# Patient Record
Sex: FEMALE | Race: WHITE | URBAN - NONMETROPOLITAN AREA
[De-identification: names, ages, dates, MRNs, and addresses within clinical notes are randomized per-mention and may not be internally consistent; named-entity substitution may affect disease eponyms.]

---

## 2017-01-09 RX ORDER — CITALOPRAM 10 MG/1
TABLET ORAL
Qty: 45 TABLET | Refills: 0 | Status: SHIPPED | OUTPATIENT
Start: 2017-01-09 | End: 2017-01-30

## 2017-01-27 ENCOUNTER — OFFICE VISIT (OUTPATIENT)
Dept: FAMILY MEDICINE CLINIC | Facility: CLINIC | Age: 32
End: 2017-01-27

## 2017-01-27 VITALS
DIASTOLIC BLOOD PRESSURE: 80 MMHG | BODY MASS INDEX: 36 KG/M2 | SYSTOLIC BLOOD PRESSURE: 130 MMHG | TEMPERATURE: 97 F | WEIGHT: 224.38 LBS

## 2017-01-27 DIAGNOSIS — R53.83 FATIGUE, UNSPECIFIED TYPE: ICD-10-CM

## 2017-01-27 DIAGNOSIS — R06.83 SNORING: ICD-10-CM

## 2017-01-27 DIAGNOSIS — E03.9 ACQUIRED HYPOTHYROIDISM: Primary | ICD-10-CM

## 2017-01-27 PROCEDURE — 99213 OFFICE O/P EST LOW 20 MIN: CPT | Performed by: FAMILY MEDICINE

## 2017-01-27 NOTE — PROGRESS NOTES
Cheri Medina is a 33year old female. Patient presents with:  Fatigue: pt battling fatigue for years; dizziness daily. . room 3      HPI:   Fatigued as noted    Sleeps heavy needs to be pushed out of bed  She is tired despite 8-9 hrs  She feels she has 120/78  02/03/16 : 120/88  01/27/16 : 120/80      Wt Readings from Last 6 Encounters:  01/27/17 : 224 lb 6 oz  11/29/16 : 213 lb  03/28/16 : 213 lb  02/25/16 : 208 lb  02/03/16 : 205 lb 4 oz  01/27/16 : 209 lb      REVIEW OF SYSTEMS:   GENERAL HEALTH: feel Expiration Date: 1/27/2018  TSH and Free T4  Standing Status: Future  Standing Expiration Date: 1/27/2018            Meds & Refills for this Visit:  No prescriptions requested or ordered in this encounter              Ronan Brooks M.D., FAAFP      The

## 2017-01-29 NOTE — PATIENT INSTRUCTIONS
What Are Snoring and Obstructive Sleep Apnea? If Bertha Mera ever had a stuffed-up nose, you know the feeling of trying to breathe through a very narrow passageway. This is what happens in your throat when you snore.  While you sleep, structures in your throa Problems in the structure of the nose may obstruct breathing. A crooked (deviated) septum or swollen turbinates can make snoring worse or lead to apnea.  Also, a receding jaw may make the tongue sit too far back, so it’s more likely to block the airway when · At a sleep clinic. Most sleep studies are done at a sleep clinic or a sleep lab. In many cases, you will need to stay overnight. You will sleep in a private room, much like a hotel or hospital room.  A family member or a friend can come along, but cannot

## 2017-01-30 ENCOUNTER — TELEPHONE (OUTPATIENT)
Dept: FAMILY MEDICINE CLINIC | Facility: CLINIC | Age: 32
End: 2017-01-30

## 2017-01-30 RX ORDER — CITALOPRAM 10 MG/1
TABLET ORAL
Qty: 135 TABLET | Refills: 0 | Status: SHIPPED | OUTPATIENT
Start: 2017-01-30 | End: 2017-05-25

## 2017-01-30 NOTE — TELEPHONE ENCOUNTER
RQI performed and per representative no RQI is needed for sleep studies and the reference number is the patient's ID, [id number]. Test result faxed to number provided.

## 2017-01-31 ENCOUNTER — TELEPHONE (OUTPATIENT)
Dept: FAMILY MEDICINE CLINIC | Facility: CLINIC | Age: 32
End: 2017-01-31

## 2017-01-31 DIAGNOSIS — G47.33 OBSTRUCTIVE SLEEP APNEA: ICD-10-CM

## 2017-01-31 DIAGNOSIS — G47.19 EXCESSIVE DAYTIME SLEEPINESS: Primary | ICD-10-CM

## 2017-01-31 NOTE — TELEPHONE ENCOUNTER
Reviewed results with patient and she voiced understanding. Per Dr. Powell: Thyroid, Iron, CBC results all normal and no anemia was seen. Results sent to scanning.

## 2017-02-06 ENCOUNTER — TELEPHONE (OUTPATIENT)
Dept: FAMILY MEDICINE CLINIC | Facility: CLINIC | Age: 32
End: 2017-02-06

## 2017-02-06 NOTE — TELEPHONE ENCOUNTER
Patient notified and she is going to try and change the time she takes her Citalopram to see if this makes a difference.

## 2017-04-06 ENCOUNTER — TELEPHONE (OUTPATIENT)
Dept: FAMILY MEDICINE CLINIC | Facility: CLINIC | Age: 32
End: 2017-04-06

## 2017-05-25 RX ORDER — CITALOPRAM 10 MG/1
TABLET ORAL
Qty: 135 TABLET | Refills: 0 | Status: SHIPPED | OUTPATIENT
Start: 2017-05-25 | End: 2017-07-31

## 2017-07-31 RX ORDER — CITALOPRAM 10 MG/1
TABLET ORAL
Qty: 135 TABLET | Refills: 0 | Status: SHIPPED | OUTPATIENT
Start: 2017-07-31 | End: 2017-10-21

## 2017-07-31 NOTE — TELEPHONE ENCOUNTER
LAST office visit: 1/27/17  Last refill: 5/25/17 # 135 with 0 refills       Reminder letter sent for office visit.

## 2017-08-22 ENCOUNTER — TELEPHONE (OUTPATIENT)
Dept: FAMILY MEDICINE CLINIC | Facility: CLINIC | Age: 32
End: 2017-08-22

## 2017-08-22 NOTE — TELEPHONE ENCOUNTER
Can she get a order for cosyntropin stimulation test at ? Will Irina Zepeda write? Irina Zepeda is the only doctor on staff there. She is Sherry's pt. Call pt.

## 2017-08-22 NOTE — TELEPHONE ENCOUNTER
Patient's endocrinologist Dr. Terrell Regalado gave patient an order for a Cosyntropin Stimulation test and the patient is asking if Dr. Alejandro Smiley would order this test to be performed at [facility] instead of [facility] ? Please advise.          This i

## 2017-08-22 NOTE — TELEPHONE ENCOUNTER
[facility] is needing [facility]? Is she having symptoms of any kind? Also advise to please contact Dr. John Sheridan for orders first, even if Dr. Adan Ignacio is needed to order. Thanks.

## 2017-08-25 NOTE — TELEPHONE ENCOUNTER
Left detailed message for pt in regards to Dr. Lincoln Anthony message.  Office number provided for questions

## 2017-10-21 ENCOUNTER — TELEPHONE (OUTPATIENT)
Dept: FAMILY MEDICINE CLINIC | Facility: CLINIC | Age: 32
End: 2017-10-21

## 2017-10-21 RX ORDER — CITALOPRAM 10 MG/1
TABLET ORAL
Qty: 21 TABLET | Refills: 0 | Status: SHIPPED | OUTPATIENT
Start: 2017-10-21 | End: 2017-10-25 | Stop reason: ALTCHOICE

## 2017-10-21 NOTE — TELEPHONE ENCOUNTER
Per epic: pt is to be taking 1.5 tablets daily but pt states she is taking 2  Per last refill, pt is to be see  No future appointments. Left message for pt to call back  Sent #21 to [facility] until pt is seen.

## 2017-10-21 NOTE — TELEPHONE ENCOUNTER
CITALOPRAM HYDROBROMIDE 10 MG    Pt is taking 2 pills per day and needs a refill. Wants to know if 10 pills can be sent to [facility] [employer] and send a full refill to [facility]?      Pt is going to be out before the mail order comes and when she does

## 2017-10-23 NOTE — TELEPHONE ENCOUNTER
Patient states that Dr. Irma Turpin advised her that it was ok to increase to 2 tablets daily. Advised pt that it is best to schedule a medication f/u with Dr. Irma Turpin.  Patient agreed   Future Appointments  Date Time Provider Amy Puri   10/25/2017 1:4

## 2017-10-25 ENCOUNTER — OFFICE VISIT (OUTPATIENT)
Dept: FAMILY MEDICINE CLINIC | Facility: CLINIC | Age: 32
End: 2017-10-25

## 2017-10-25 VITALS
SYSTOLIC BLOOD PRESSURE: 120 MMHG | HEART RATE: 74 BPM | HEIGHT: 66 IN | TEMPERATURE: 98 F | DIASTOLIC BLOOD PRESSURE: 80 MMHG | BODY MASS INDEX: 35.2 KG/M2 | OXYGEN SATURATION: 99 % | WEIGHT: 219 LBS

## 2017-10-25 DIAGNOSIS — Z79.899 ENCOUNTER FOR LONG-TERM (CURRENT) USE OF MEDICATIONS: Primary | ICD-10-CM

## 2017-10-25 DIAGNOSIS — E03.9 ACQUIRED HYPOTHYROIDISM: ICD-10-CM

## 2017-10-25 DIAGNOSIS — F41.9 ANXIETY: ICD-10-CM

## 2017-10-25 DIAGNOSIS — E55.9 VITAMIN D DEFICIENCY: ICD-10-CM

## 2017-10-25 PROCEDURE — 99213 OFFICE O/P EST LOW 20 MIN: CPT | Performed by: FAMILY MEDICINE

## 2017-10-25 RX ORDER — CLINDAMYCIN AND BENZOYL PEROXIDE 10; 50 MG/G; MG/G
GEL TOPICAL
Refills: 11 | COMMUNITY
Start: 2017-08-30

## 2017-10-25 RX ORDER — CITALOPRAM 20 MG/1
20 TABLET ORAL DAILY
Qty: 90 TABLET | Refills: 1 | Status: SHIPPED | OUTPATIENT
Start: 2017-10-25 | End: 2018-04-06

## 2017-10-25 RX ORDER — ERGOCALCIFEROL 1.25 MG/1
50000 CAPSULE ORAL
COMMUNITY
Start: 2017-10-02 | End: 2019-02-20 | Stop reason: ALTCHOICE

## 2017-10-25 NOTE — PROGRESS NOTES
Illene Situ is a 27year old female. No chief complaint on file.       HPI:   Here for follow up  Feels the citalopram is the right dose    Tolerates it well    Denies other issues         Feels better  Sees endo for thyroid nodule  Replacement  And al auscultation  CARDIO: RRR without murmur    MOOD  Well controlled  Happy not tearful  Insightful    ASSESSMENT AND PLAN:     Encounter for long-term (current) use of medications  (primary encounter diagnosis)  Anxiety  Vitamin d deficiency  Acquired hypoth

## 2017-10-25 NOTE — PATIENT INSTRUCTIONS
Vitamin D  Does this test have other names? 25-hydroxyvitamin D (25-high-DROX-ee-VIE-tuh-min D), 25(OH)D  What is this test?  Vitamin D is mainly found in fortified dairy foods, juice, breakfast cereal, and certain fish.  This vitamin plays many roles in A result for a lab test may be affected by many things, including the method the laboratory uses to do the test. If your test results are different from the normal value, you may not have a problem.  To learn what the results mean for you, talk with your he © 1518-7718 [employer] [address] [facility] [place]. All rights reserved. This information is not intended as a substitute for professional medical care. Always follow your healthcare professional's instructions.

## 2017-11-08 ENCOUNTER — TELEPHONE (OUTPATIENT)
Dept: FAMILY MEDICINE CLINIC | Facility: CLINIC | Age: 32
End: 2017-11-08

## 2017-11-08 NOTE — TELEPHONE ENCOUNTER
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 Pt is also wanting to know if Dr. Ksenia Yang will use the information from the last OV (10/25) mfor the school assessment form.  Pt states that it is only requires height, weight, BP and

## 2017-11-08 NOTE — TELEPHONE ENCOUNTER
WOULD DR BE WILLING TO FILL OUT HEALTH ASSESSMENT FORM FROM HER LAST OFFICE VISIT, JUST NEED HEIGHT, WEIGHT, ETC.... CALL PT

## 2017-11-09 ENCOUNTER — TELEPHONE (OUTPATIENT)
Dept: FAMILY MEDICINE CLINIC | Facility: CLINIC | Age: 32
End: 2017-11-09

## 2017-11-09 DIAGNOSIS — Z01.84 IMMUNITY STATUS TESTING: ICD-10-CM

## 2017-11-09 DIAGNOSIS — Z11.1 SCREENING FOR TUBERCULOSIS: Primary | ICD-10-CM

## 2017-11-09 NOTE — TELEPHONE ENCOUNTER
Pt states that she was advised that she needs to have college titers (MMR, Varicella, Hep B) done.  Pt would like to get them at [address] since she works there  Labs placed and faxed to [facility]  Date Time Provider Amy Puri   11/13/2017

## 2017-11-13 ENCOUNTER — NURSE ONLY (OUTPATIENT)
Dept: FAMILY MEDICINE CLINIC | Facility: CLINIC | Age: 32
End: 2017-11-13

## 2017-11-13 PROCEDURE — 86580 TB INTRADERMAL TEST: CPT | Performed by: FAMILY MEDICINE

## 2017-11-15 ENCOUNTER — NURSE ONLY (OUTPATIENT)
Dept: FAMILY MEDICINE CLINIC | Facility: CLINIC | Age: 32
End: 2017-11-15

## 2017-11-15 DIAGNOSIS — Z11.1 TUBERCULOSIS SCREENING: Primary | ICD-10-CM

## 2017-11-17 ENCOUNTER — TELEPHONE (OUTPATIENT)
Dept: FAMILY MEDICINE CLINIC | Facility: CLINIC | Age: 32
End: 2017-11-17

## 2017-11-17 DIAGNOSIS — H54.60: ICD-10-CM

## 2017-11-17 DIAGNOSIS — H47.10 OPTIC NERVE EDEMA: Primary | ICD-10-CM

## 2017-11-17 DIAGNOSIS — R93.89 ABNORMAL ULTRASOUND: ICD-10-CM

## 2017-11-17 NOTE — TELEPHONE ENCOUNTER
Patient in office. States when she was seen she did not pass vision portion part of physical.  Was referred to opthalmologist and found to have fluid on optic nerve. Was further referred to specialist whom she is seeing this afternoon.   Wants to know how

## 2017-11-17 NOTE — TELEPHONE ENCOUNTER
Pt was in with daughter for appt with Dr. Mamadou Waldron, would like to speak to nurse regarding herself, said it was personal. Advised nurse will be at lunch when they come out from appt and that nurse would call her back this afternoon.

## 2017-11-17 NOTE — TELEPHONE ENCOUNTER
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 On US, Dr. Mary Garcia sees something that appears to be pushing the eyes forward. He is recommending MRI brain and orbit with and without contrast.  Wants this done in the next couple days. Patient works at [employer].

## 2017-11-20 ENCOUNTER — NURSE ONLY (OUTPATIENT)
Dept: FAMILY MEDICINE CLINIC | Facility: CLINIC | Age: 32
End: 2017-11-20

## 2017-11-20 ENCOUNTER — TELEPHONE (OUTPATIENT)
Dept: FAMILY MEDICINE CLINIC | Facility: CLINIC | Age: 32
End: 2017-11-20

## 2017-11-20 PROCEDURE — 86580 TB INTRADERMAL TEST: CPT

## 2017-11-22 ENCOUNTER — NURSE ONLY (OUTPATIENT)
Dept: FAMILY MEDICINE CLINIC | Facility: CLINIC | Age: 32
End: 2017-11-22

## 2017-12-14 ENCOUNTER — MED REC SCAN ONLY (OUTPATIENT)
Dept: FAMILY MEDICINE CLINIC | Facility: CLINIC | Age: 32
End: 2017-12-14

## 2018-01-02 ENCOUNTER — MED REC SCAN ONLY (OUTPATIENT)
Dept: FAMILY MEDICINE CLINIC | Facility: CLINIC | Age: 33
End: 2018-01-02

## 2018-04-03 ENCOUNTER — TELEPHONE (OUTPATIENT)
Dept: FAMILY MEDICINE CLINIC | Facility: CLINIC | Age: 33
End: 2018-04-03

## 2018-04-03 NOTE — TELEPHONE ENCOUNTER
Called [facility] for PA on Adderall 10mg, spoke with Jassi Serrano.    PA approved  4/3/2018 to 4/3/2021  PA#  [id number]

## 2018-04-03 NOTE — PATIENT INSTRUCTIONS
Amphetamine; Dextroamphetamine tablets  Brand Name: Adderall  What is this medicine? AMPHETAMINE; DEXTROAMPHETAMINE(am FET a meen; dex troe am FET a meen) is used to treat attention-deficit hyperactivity disorder (ADHD).  It may also be used for narcolep · loss of appetite  · nausea, vomiting  · trouble sleeping  · weight loss  What may interact with this medicine?   Do not take this medicine with any of the following medications:  · MAOIS like Carbex, Eldepryl, Marplan, Nardil, and Parnate  · other stimula Store at room temperature between 15 and 30 degrees C (59 and 86 degrees F). Keep container tightly closed. Throw away any unused medicine after the expiration date. Dispose of properly.  This medicine may cause accidental overdose and death if it is taken Tell your doctor or health care professional if this medicine loses its effects, or if you feel you need to take more than the prescribed amount.  Do not change the dosage without talking to your doctor or health care professional.  Decreased appetite is a

## 2018-04-03 NOTE — PROGRESS NOTES
Silvia Mcgregor is a 27year old female. Patient presents with: Anxiety: pt feeling stressed from factors in her current life. . room 5      HPI:   Pt here for anxiety  Now in nurses school  And clinical  Has difficulty with focus in busy environments and denies any unusual skin lesions or rashes  PSY MOOD  see HPI    NEURO: denies headaches    EXAM:   /70   Temp 98.5 °F (36.9 °C) (Temporal)   Wt 234 lb 2 oz   BMI 37.79 kg/m²  Body mass index is 37.79 kg/m².       GENERAL: well developed, well nourishe

## 2018-04-06 RX ORDER — CITALOPRAM 20 MG/1
TABLET ORAL
Qty: 90 TABLET | Refills: 1 | Status: SHIPPED | OUTPATIENT
Start: 2018-04-06 | End: 2018-04-30

## 2018-04-30 ENCOUNTER — TELEPHONE (OUTPATIENT)
Dept: FAMILY MEDICINE CLINIC | Facility: CLINIC | Age: 33
End: 2018-04-30

## 2018-04-30 RX ORDER — CITALOPRAM 20 MG/1
20 TABLET ORAL
Qty: 90 TABLET | Refills: 0 | Status: SHIPPED | OUTPATIENT
Start: 2018-04-30 | End: 2018-04-30

## 2018-04-30 RX ORDER — CITALOPRAM 20 MG/1
20 TABLET ORAL
Qty: 30 TABLET | Refills: 0 | Status: SHIPPED | OUTPATIENT
Start: 2018-04-30 | End: 2018-11-14

## 2018-04-30 NOTE — TELEPHONE ENCOUNTER
Pt states [facility] not [facility] any longer and needs script to go to [facility]. Needs 30 day sent to [facility]

## 2018-04-30 NOTE — TELEPHONE ENCOUNTER
Citalopram    [facility] MAIL ORDER NOW      SHE NEEDS SOME TO GO TO [facility] IN [place] TO HOLD HER OVER UNTIL THE MAIL ORDER GETS TO HER

## 2018-05-15 ENCOUNTER — OFFICE VISIT (OUTPATIENT)
Dept: FAMILY MEDICINE CLINIC | Facility: CLINIC | Age: 33
End: 2018-05-15

## 2018-05-15 VITALS
DIASTOLIC BLOOD PRESSURE: 82 MMHG | HEART RATE: 74 BPM | WEIGHT: 230.5 LBS | OXYGEN SATURATION: 98 % | BODY MASS INDEX: 37 KG/M2 | SYSTOLIC BLOOD PRESSURE: 120 MMHG | TEMPERATURE: 99 F

## 2018-05-15 DIAGNOSIS — Z79.899 ENCOUNTER FOR LONG-TERM (CURRENT) USE OF MEDICATIONS: Primary | ICD-10-CM

## 2018-05-15 DIAGNOSIS — F98.8 ATTENTION DEFICIT DISORDER (ADD) WITHOUT HYPERACTIVITY: ICD-10-CM

## 2018-05-15 PROCEDURE — 99213 OFFICE O/P EST LOW 20 MIN: CPT | Performed by: FAMILY MEDICINE

## 2018-05-15 RX ORDER — DEXTROAMPHETAMINE SACCHARATE, AMPHETAMINE ASPARTATE MONOHYDRATE, DEXTROAMPHETAMINE SULFATE AND AMPHETAMINE SULFATE 2.5; 2.5; 2.5; 2.5 MG/1; MG/1; MG/1; MG/1
10 CAPSULE, EXTENDED RELEASE ORAL EVERY MORNING
Qty: 30 CAPSULE | Refills: 0 | Status: SHIPPED | OUTPATIENT
Start: 2018-05-15 | End: 2018-07-03

## 2018-05-15 RX ORDER — DEXTROAMPHETAMINE SACCHARATE, AMPHETAMINE ASPARTATE, DEXTROAMPHETAMINE SULFATE AND AMPHETAMINE SULFATE 2.5; 2.5; 2.5; 2.5 MG/1; MG/1; MG/1; MG/1
TABLET ORAL
COMMUNITY
Start: 2018-04-03 | End: 2018-05-15 | Stop reason: DRUGHIGH

## 2018-05-15 NOTE — PROGRESS NOTES
Jose Moralez is a 27year old female. Patient presents with:   Follow - Up: room 6    Subjective   HPI:   Here for follow  she states she is very good on the dose  But it appears to wear off too soon and she feels she may need the same dose in an extend rashes  RESPIRATORY: denies shortness of breath with exertion  CARDIOVASCULAR: denies chest pain on exertion  GI: denies abdominal pain and denies heartburn  NEURO: denies headaches  Tolerates medications  PSY   Focus   See HPI      Objective   EXAM:   BP

## 2018-05-15 NOTE — PATIENT INSTRUCTIONS
Amphetamine; Dextroamphetamine extended-release capsules  Brand Names: Adderall XR, Mydayis  What is this medicine? AMPHETAMINE; DEXTROAMPHETAMINE (am FET a meen; dex troe am FET a meen) is used to treat attention-deficit hyperactivity disorder (ADHD). · suicidal thoughts or other mood changes  · trouble walking, dizziness, loss of balance or coordination  · uncontrollable head, mouth, neck, arm, or leg movements  Side effects that usually do not require medical attention (report to your doctor or health Store at room temperature between 15 and 30 degrees C (59 and 86 degrees F). Keep container tightly closed. Protect from light. Throw away any unused medicine after the expiration date.   What should I tell my health care provider before I take this medicin Decreased appetite is a common side effect when starting this medicine. Eating small, frequent meals or snacks can help. Talk to your doctor if you continue to have poor eating habits.  Height and weight growth of a child taking this medicine will be [employer]

## 2018-07-03 ENCOUNTER — OFFICE VISIT (OUTPATIENT)
Dept: FAMILY MEDICINE CLINIC | Facility: CLINIC | Age: 33
End: 2018-07-03

## 2018-07-03 VITALS
BODY MASS INDEX: 37.08 KG/M2 | HEART RATE: 76 BPM | SYSTOLIC BLOOD PRESSURE: 114 MMHG | TEMPERATURE: 98 F | WEIGHT: 233.5 LBS | OXYGEN SATURATION: 98 % | DIASTOLIC BLOOD PRESSURE: 80 MMHG | HEIGHT: 66.5 IN

## 2018-07-03 DIAGNOSIS — Z13.220 LIPID SCREENING: ICD-10-CM

## 2018-07-03 DIAGNOSIS — F98.8 ATTENTION DEFICIT DISORDER (ADD) WITHOUT HYPERACTIVITY: ICD-10-CM

## 2018-07-03 DIAGNOSIS — Z00.00 LABORATORY EXAM ORDERED AS PART OF ROUTINE GENERAL MEDICAL EXAMINATION: ICD-10-CM

## 2018-07-03 DIAGNOSIS — Z79.899 ENCOUNTER FOR LONG-TERM (CURRENT) USE OF MEDICATIONS: Primary | ICD-10-CM

## 2018-07-03 DIAGNOSIS — Z13.1 SCREENING FOR DIABETES MELLITUS: ICD-10-CM

## 2018-07-03 PROCEDURE — 99395 PREV VISIT EST AGE 18-39: CPT | Performed by: FAMILY MEDICINE

## 2018-07-03 RX ORDER — DEXTROAMPHETAMINE SACCHARATE, AMPHETAMINE ASPARTATE MONOHYDRATE, DEXTROAMPHETAMINE SULFATE AND AMPHETAMINE SULFATE 2.5; 2.5; 2.5; 2.5 MG/1; MG/1; MG/1; MG/1
10 CAPSULE, EXTENDED RELEASE ORAL EVERY MORNING
Qty: 30 CAPSULE | Refills: 0 | Status: SHIPPED | OUTPATIENT
Start: 2018-07-03 | End: 2018-10-02

## 2018-07-03 NOTE — PATIENT INSTRUCTIONS
SCREEN COVERAGE SCHEDULE  (If Indicated)    Dexa If at Risk  Age > 50 q2 years, at least 48 or older    Lipids All Patients q5 years    Colonoscopy All Patients  Age > 50 q10 years,     FBS All Patients q1 year    Glaucoma If at high risk q1 year    Pap

## 2018-07-03 NOTE — PROGRESS NOTES
Jennifer Rojas is a 27year old female. CC:  Patient presents with:  Physical: refills meds. .room 6    Subjective   HPI:  No complaint at this time, Adderall seems to help but she only uses it on the day she really needs to focus and seems to be well TDAP                  06/28/2014      Tb Intradermal Test   11/13/2017 11/20/2017          Wt Readings from Last 6 Encounters:  07/03/18 : 233 lb 8 oz  05/15/18 : 230 lb 8 oz  04/03/18 : 234 lb 2 oz  10/25/17 : 219 lb  01/27/17 : 224 lb 6 oz  11/29/16 long-term (current) use of medications    -  Primary    Laboratory exam ordered as part of routine general medical examination        Relevant Orders    COMP METABOLIC PANEL (14)    LIPID PANEL    Lipid screening        Relevant Orders    LIPID PANEL    Sc

## 2018-07-17 ENCOUNTER — TELEPHONE (OUTPATIENT)
Dept: FAMILY MEDICINE CLINIC | Facility: CLINIC | Age: 33
End: 2018-07-17

## 2018-07-17 ENCOUNTER — OFFICE VISIT (OUTPATIENT)
Dept: FAMILY MEDICINE CLINIC | Facility: CLINIC | Age: 33
End: 2018-07-17
Payer: COMMERCIAL

## 2018-07-17 VITALS
BODY MASS INDEX: 37.67 KG/M2 | OXYGEN SATURATION: 94 % | TEMPERATURE: 100 F | HEART RATE: 90 BPM | DIASTOLIC BLOOD PRESSURE: 86 MMHG | WEIGHT: 240 LBS | HEIGHT: 67 IN | SYSTOLIC BLOOD PRESSURE: 120 MMHG
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
## 2018-07-17 DIAGNOSIS — L30.9 DERMATITIS: ICD-10-CM

## 2018-07-17 PROCEDURE — 99213 OFFICE O/P EST LOW 20 MIN: CPT | Performed by: FAMILY MEDICINE

## 2018-07-17 RX ORDER — CEPHALEXIN 500 MG/1
500 CAPSULE ORAL 3 TIMES DAILY
Qty: 30 CAPSULE | Refills: 1 | Status: SHIPPED | OUTPATIENT
Start: 2018-07-17 | End: 2018-11-14

## 2018-07-17 RX ORDER — TRIAMCINOLONE ACETONIDE 5 MG/G
CREAM TOPICAL
Qty: 60 G | Refills: 0 | Status: SHIPPED | OUTPATIENT
Start: 2018-07-17

## 2018-07-17 NOTE — TELEPHONE ENCOUNTER
Per Dr. Powell: pt is to take for full 10 days  Patient notified and verbalized understanding of the information provided

## 2018-07-17 NOTE — TELEPHONE ENCOUNTER
QUESTION ABOUT TAKING ANTIBIOTIC IS SHE SUPPOSED TO TAKE UNTIL GONE OR UNTIL FLARE UP IS BETTER?   PLEASE ADVISE

## 2018-07-17 NOTE — TELEPHONE ENCOUNTER
Future Appointments  Date Time Provider Amy Puri   7/17/2018 3:00 PM Melodie David MD [facility]     Pt advised there may be a wait.  Patient notified and verbalized understanding of the information provided'

## 2018-07-17 NOTE — TELEPHONE ENCOUNTER
Pt. Has a bug bite on the back of her left calf and she said it is red, swollen, itchy, hot to touch. She wanted to be seen today if possible. She works until 3 can be here by 3:10/FYI.

## 2018-07-17 NOTE — PATIENT INSTRUCTIONS
Cephalexin tablets or capsules  Brand Names: Alex Hanna  What is this medicine? CEPHALEXIN (sef a STEPHAN in) is a cephalosporin antibiotic.  It is used to treat certain kinds of bacterial infections It will not work for colds, flu, or other anthony Store at room temperature between 59 and 86 degrees F (15 and 30 degrees C). Throw away any unused medicine after the expiration date. What should I tell my health care provider before I take this medicine?   They need to know if you have any of these cond

## 2018-07-17 NOTE — TELEPHONE ENCOUNTER
Pt wants to have her blood work done @ ,  Please fax Orders to [phone]  Would like to have this done tomorrow.

## 2018-07-17 NOTE — PROGRESS NOTES
Alvin Severs is a 27year old female. Patient presents with: Insect Bite: noticed Wed. or Thurs. possible bee sting. .. itching and red. . room 5    Subjective   HPI:   Stung by black bee or wasp  On the calf  Left leg  Spreading and itches  Using corti 230 lb 8 oz  04/03/18 : 234 lb 2 oz  10/25/17 : 219 lb  01/27/17 : 224 lb 6 oz      REVIEW OF SYSTEMS:   GENERAL HEALTH: feels well no complaints  No fever  SKIN:chp   RESPIRATORY: denies shortness of breath with exertion  CARDIOVASCULAR: denies chest pain

## 2018-07-18 ENCOUNTER — MED REC SCAN ONLY (OUTPATIENT)
Dept: FAMILY MEDICINE CLINIC | Facility: CLINIC | Age: 33
End: 2018-07-18

## 2018-07-18 LAB
AMB EXT CHOL/HDL RATIO: 6
AMB EXT CHOLESTEROL, TOTAL: 217 MG/DL
AMB EXT CREATININE: 0.76 MG/DL
AMB EXT GFR: 93
AMB EXT GLUCOSE: 108 MG/DL
AMB EXT HDL CHOLESTEROL: 38 MG/DL
AMB EXT LDL CHOLESTEROL, DIRECT: 131 MG/DL
AMB EXT NON HDL CHOL: 179 MG/DL
AMB EXT TRIGLYCERIDES: 242 MG/DL
AMB EXT VLDL: 48 MG/DL

## 2018-08-14 RX ORDER — CITALOPRAM 20 MG/1
TABLET ORAL
Qty: 90 TABLET | Refills: 0 | Status: SHIPPED | OUTPATIENT
Start: 2018-08-14 | End: 2018-11-01

## 2018-10-02 DIAGNOSIS — F98.8 ATTENTION DEFICIT DISORDER (ADD) WITHOUT HYPERACTIVITY: ICD-10-CM

## 2018-10-02 RX ORDER — DEXTROAMPHETAMINE SACCHARATE, AMPHETAMINE ASPARTATE MONOHYDRATE, DEXTROAMPHETAMINE SULFATE AND AMPHETAMINE SULFATE 2.5; 2.5; 2.5; 2.5 MG/1; MG/1; MG/1; MG/1
10 CAPSULE, EXTENDED RELEASE ORAL EVERY MORNING
Qty: 30 CAPSULE | Refills: 0 | Status: SHIPPED | OUTPATIENT
Start: 2018-10-02 | End: 2018-11-14

## 2018-10-02 NOTE — TELEPHONE ENCOUNTER
To [facility] [phone] EXP 8/21/21    PT PICKED UP ONE SCRIPT

## 2018-11-01 RX ORDER — CITALOPRAM 20 MG/1
TABLET ORAL
Qty: 90 TABLET | Refills: 0 | Status: SHIPPED | OUTPATIENT
Start: 2018-11-01 | End: 2019-02-06 | Stop reason: DRUGHIGH

## 2018-11-14 ENCOUNTER — OFFICE VISIT (OUTPATIENT)
Dept: FAMILY MEDICINE CLINIC | Facility: CLINIC | Age: 33
End: 2018-11-14
Payer: COMMERCIAL

## 2018-11-14 ENCOUNTER — APPOINTMENT (OUTPATIENT)
Dept: LAB | Age: 33
End: 2018-11-14
Attending: FAMILY MEDICINE
Payer: COMMERCIAL

## 2018-11-14 VITALS
OXYGEN SATURATION: 93 % | DIASTOLIC BLOOD PRESSURE: 60 MMHG | SYSTOLIC BLOOD PRESSURE: 108 MMHG | BODY MASS INDEX: 37 KG/M2 | WEIGHT: 235.38 LBS | HEART RATE: 80 BPM | TEMPERATURE: 99 F

## 2018-11-14 DIAGNOSIS — E03.9 ACQUIRED HYPOTHYROIDISM: ICD-10-CM

## 2018-11-14 DIAGNOSIS — F98.8 ATTENTION DEFICIT DISORDER (ADD) WITHOUT HYPERACTIVITY: ICD-10-CM

## 2018-11-14 DIAGNOSIS — R53.83 OTHER FATIGUE: ICD-10-CM

## 2018-11-14 DIAGNOSIS — F41.9 ANXIETY: ICD-10-CM

## 2018-11-14 DIAGNOSIS — R53.83 OTHER FATIGUE: Primary | ICD-10-CM

## 2018-11-14 PROCEDURE — 83550 IRON BINDING TEST: CPT

## 2018-11-14 PROCEDURE — 84443 ASSAY THYROID STIM HORMONE: CPT

## 2018-11-14 PROCEDURE — 99213 OFFICE O/P EST LOW 20 MIN: CPT | Performed by: FAMILY MEDICINE

## 2018-11-14 PROCEDURE — 80053 COMPREHEN METABOLIC PANEL: CPT

## 2018-11-14 PROCEDURE — 82306 VITAMIN D 25 HYDROXY: CPT

## 2018-11-14 PROCEDURE — 82728 ASSAY OF FERRITIN: CPT

## 2018-11-14 PROCEDURE — 36415 COLL VENOUS BLD VENIPUNCTURE: CPT

## 2018-11-14 PROCEDURE — 83540 ASSAY OF IRON: CPT

## 2018-11-14 RX ORDER — DEXTROAMPHETAMINE SACCHARATE, AMPHETAMINE ASPARTATE MONOHYDRATE, DEXTROAMPHETAMINE SULFATE AND AMPHETAMINE SULFATE 2.5; 2.5; 2.5; 2.5 MG/1; MG/1; MG/1; MG/1
10 CAPSULE, EXTENDED RELEASE ORAL EVERY MORNING
Qty: 30 CAPSULE | Refills: 0 | Status: SHIPPED | OUTPATIENT
Start: 2018-11-14 | End: 2019-01-23

## 2018-11-14 RX ORDER — DEXTROAMPHETAMINE SACCHARATE, AMPHETAMINE ASPARTATE, DEXTROAMPHETAMINE SULFATE AND AMPHETAMINE SULFATE 2.5; 2.5; 2.5; 2.5 MG/1; MG/1; MG/1; MG/1
10 TABLET ORAL 2 TIMES DAILY
Qty: 60 TABLET | Refills: 0 | Status: SHIPPED | OUTPATIENT
Start: 2018-11-14 | End: 2018-12-14

## 2018-11-14 RX ORDER — CITALOPRAM 40 MG/1
40 TABLET ORAL
Qty: 90 TABLET | Refills: 1 | Status: SHIPPED | OUTPATIENT
Start: 2018-11-14 | End: 2019-02-06

## 2018-11-14 NOTE — PROGRESS NOTES
Katherin Olson is a 26year old female. Patient presents with:   Follow - Up: labs and follow up    Subjective   HPI:   Complains of fatigue    Has no motivation  States she seems to sleep well  And sleeps a lot  But is always tired    She states she had from Last 6 Encounters:  11/14/18 : 235 lb 6 oz  07/17/18 : 240 lb  07/03/18 : 233 lb 8 oz  05/15/18 : 230 lb 8 oz  04/03/18 : 234 lb 2 oz  10/25/17 : 219 lb    Chemistry Labs:   Lab Results   Component Value Date/Time     07/18/2018    CREATSERUM 0 Relevant Orders    TSH W REFLEX TO FREE T4      Other Visit Diagnoses     Other fatigue    -  Primary    Relevant Medications    Citalopram Hydrobromide 40 MG Oral Tab    Other Relevant Orders    COMP METABOLIC PANEL (14)    TSH W REFLEX TO FREE T4    GEORGI

## 2018-11-15 DIAGNOSIS — E55.9 VITAMIN D DEFICIENCY: Primary | ICD-10-CM

## 2019-01-23 ENCOUNTER — TELEPHONE (OUTPATIENT)
Dept: FAMILY MEDICINE CLINIC | Facility: CLINIC | Age: 34
End: 2019-01-23

## 2019-01-23 DIAGNOSIS — F98.8 ATTENTION DEFICIT DISORDER (ADD) WITHOUT HYPERACTIVITY: ICD-10-CM

## 2019-01-23 RX ORDER — DEXTROAMPHETAMINE SACCHARATE, AMPHETAMINE ASPARTATE MONOHYDRATE, DEXTROAMPHETAMINE SULFATE AND AMPHETAMINE SULFATE 2.5; 2.5; 2.5; 2.5 MG/1; MG/1; MG/1; MG/1
10 CAPSULE, EXTENDED RELEASE ORAL EVERY MORNING
Qty: 30 CAPSULE | Refills: 0 | Status: SHIPPED | OUTPATIENT
Start: 2019-01-23 | End: 2019-03-06

## 2019-01-29 RX ORDER — CITALOPRAM 20 MG/1
TABLET ORAL
Qty: 90 TABLET | Refills: 0 | OUTPATIENT
Start: 2019-01-29

## 2019-02-06 ENCOUNTER — TELEPHONE (OUTPATIENT)
Dept: FAMILY MEDICINE CLINIC | Facility: CLINIC | Age: 34
End: 2019-02-06

## 2019-02-06 DIAGNOSIS — R53.83 OTHER FATIGUE: ICD-10-CM

## 2019-02-06 RX ORDER — CITALOPRAM 40 MG/1
40 TABLET ORAL
Qty: 90 TABLET | Refills: 0 | Status: SHIPPED | OUTPATIENT
Start: 2019-02-06 | End: 2019-06-13

## 2019-02-06 NOTE — TELEPHONE ENCOUNTER
Spoke with Sunshine Chaparro at [employer] who states that a new script needs to be sent in for the Citalopram. Advised aurelia that pt's dose was increased on 11/14/2018 to 40mg. Sunshine Chaparro states that she needs a new script sent.    Script sent

## 2019-02-14 ENCOUNTER — TELEPHONE (OUTPATIENT)
Dept: FAMILY MEDICINE CLINIC | Facility: CLINIC | Age: 34
End: 2019-02-14

## 2019-02-14 NOTE — TELEPHONE ENCOUNTER
ANXIETY IS REALLY BAD, SHE IS UP TIL AFTER 2:30AM. WITH TAKING MELATONIN AND SHE FEELS LIKE HER HEART IS RACING A LOT.  IS THERE ANYTHING ELSE SHE CAN DO UNTIL HER  APPT Wednesday WITH DR Sandy Coley OR SHOULD SHE BE SEEN SOONER?  PLEASE ADVISE

## 2019-02-14 NOTE — TELEPHONE ENCOUNTER
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 Pt states that she is up at 2:30 in the morning and she cannot keep going like this.  States she has been taking melatonin to see if that will help her fall asleep but it is n

## 2019-02-15 PROBLEM — F41.9 ANXIETY: Status: ACTIVE | Noted: 2019-02-15

## 2019-02-15 NOTE — PROGRESS NOTES
Desmond Leblanc is a 26year old female. HPI:   Palaciosal Keys is here to discuss her anxiety. She is on citalopram 40 mg and adderal xr 10 mg. Feels heart beating fast. School is her stressor.   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

## 2019-02-15 NOTE — PATIENT INSTRUCTIONS
Understanding Anxiety Disorders    Almost everyone gets nervous now and then. It’s normal to have knots in your stomach before a test, or for your heart to race on a first date. But an anxiety disorder is much more than a case of nerves.  In fact, its sym You may believe that nothing can help you. Or, you might fear what others may think. But most anxiety symptoms can be eased. Having an anxiety disorder is nothing to be ashamed of. Most people do best with treatment that combines medicine and therapy.  Thes · Anti-anxiety medicine. This medicine eases symptoms and helps you relax. Your healthcare provider will explain when and how to use it. It may be prescribed for use before situations that make you anxious.  You may also be told to take medicine on a regula · You may need to stop taking medicine slowly to give your body time to adjust. When it’s time to stop, your healthcare provider will tell you more.  Remember: Never stop taking your medicine without talking to your provider first.  · If symptoms return, yo If you have an anxiety disorder, you don’t have to suffer anymore. Treatment is available. Therapy (also called counseling) is often a helpful treatment for anxiety disorders.  With therapy, a specially trained professional (therapist) helps you face and le Therapy will help you feel better and teach you skills to help manage anxiety long term. But change doesn’t happen right away. It takes a commitment from you. And treatment only works if you learn to face the causes of your anxiety.  So, you might feel wors © 8050-9862 [employer] [address]. [address] [facility], [facility] [place]. All rights reserved. This information is not intended as a substitute for professional medical care. Always follow your healthcare professional's instructions.

## 2019-02-20 ENCOUNTER — LAB ENCOUNTER (OUTPATIENT)
Dept: LAB | Age: 34
End: 2019-02-20
Attending: FAMILY MEDICINE
Payer: COMMERCIAL

## 2019-02-20 ENCOUNTER — OFFICE VISIT (OUTPATIENT)
Dept: FAMILY MEDICINE CLINIC | Facility: CLINIC | Age: 34
End: 2019-02-20
Payer: COMMERCIAL

## 2019-02-20 VITALS
RESPIRATION RATE: 12 BRPM | HEART RATE: 80 BPM | DIASTOLIC BLOOD PRESSURE: 64 MMHG | TEMPERATURE: 98 F | WEIGHT: 239.38 LBS | SYSTOLIC BLOOD PRESSURE: 110 MMHG | BODY MASS INDEX: 37.57 KG/M2 | HEIGHT: 67 IN

## 2019-02-20 DIAGNOSIS — R53.83 OTHER FATIGUE: Primary | ICD-10-CM

## 2019-02-20 DIAGNOSIS — R53.83 OTHER FATIGUE: ICD-10-CM

## 2019-02-20 DIAGNOSIS — F51.04 PSYCHOPHYSIOLOGICAL INSOMNIA: ICD-10-CM

## 2019-02-20 DIAGNOSIS — Z83.2 FAMILY HISTORY OF PERNICIOUS ANEMIA: ICD-10-CM

## 2019-02-20 DIAGNOSIS — F41.9 ANXIETY: ICD-10-CM

## 2019-02-20 LAB
BASOPHILS # BLD AUTO: 0.05 X10(3) UL (ref 0–0.2)
BASOPHILS NFR BLD AUTO: 0.5 %
DEPRECATED HBV CORE AB SER IA-ACNC: 81.7 NG/ML (ref 12–160)
DEPRECATED RDW RBC AUTO: 40.1 FL (ref 35.1–46.3)
EOSINOPHIL # BLD AUTO: 0.1 X10(3) UL (ref 0–0.7)
EOSINOPHIL NFR BLD AUTO: 1 %
ERYTHROCYTE [DISTWIDTH] IN BLOOD BY AUTOMATED COUNT: 13.1 % (ref 11–15)
FOLATE SERPL-MCNC: 17.1 NG/ML (ref 8.7–?)
HCT VFR BLD AUTO: 39 % (ref 35–48)
HGB BLD-MCNC: 12.6 G/DL (ref 12–16)
IMM GRANULOCYTES # BLD AUTO: 0.04 X10(3) UL (ref 0–1)
IMM GRANULOCYTES NFR BLD: 0.4 %
IRON SATURATION: 13 % (ref 15–50)
IRON SERPL-MCNC: 53 UG/DL (ref 50–170)
LYMPHOCYTES # BLD AUTO: 3.23 X10(3) UL (ref 1–4)
LYMPHOCYTES NFR BLD AUTO: 33.6 %
MCH RBC QN AUTO: 27.5 PG (ref 26–34)
MCHC RBC AUTO-ENTMCNC: 32.3 G/DL (ref 31–37)
MCV RBC AUTO: 85 FL (ref 80–100)
MONOCYTES # BLD AUTO: 0.91 X10(3) UL (ref 0.1–1)
MONOCYTES NFR BLD AUTO: 9.5 %
NEUTROPHILS # BLD AUTO: 5.27 X10 (3) UL (ref 1.5–7.7)
NEUTROPHILS # BLD AUTO: 5.27 X10(3) UL (ref 1.5–7.7)
NEUTROPHILS NFR BLD AUTO: 55 %
PLATELET # BLD AUTO: 347 10(3)UL (ref 150–450)
RBC # BLD AUTO: 4.59 X10(6)UL (ref 3.8–5.3)
TOTAL IRON BINDING CAPACITY: 422 UG/DL (ref 240–450)
TRANSFERRIN SERPL-MCNC: 283 MG/DL (ref 200–360)
VIT B12 SERPL-MCNC: 210 PG/ML (ref 193–986)
WBC # BLD AUTO: 9.6 X10(3) UL (ref 4–11)

## 2019-02-20 PROCEDURE — 83550 IRON BINDING TEST: CPT

## 2019-02-20 PROCEDURE — 86340 INTRINSIC FACTOR ANTIBODY: CPT

## 2019-02-20 PROCEDURE — 99213 OFFICE O/P EST LOW 20 MIN: CPT | Performed by: FAMILY MEDICINE

## 2019-02-20 PROCEDURE — 83540 ASSAY OF IRON: CPT

## 2019-02-20 PROCEDURE — 82746 ASSAY OF FOLIC ACID SERUM: CPT

## 2019-02-20 PROCEDURE — 36415 COLL VENOUS BLD VENIPUNCTURE: CPT

## 2019-02-20 PROCEDURE — 82728 ASSAY OF FERRITIN: CPT

## 2019-02-20 PROCEDURE — 82607 VITAMIN B-12: CPT

## 2019-02-20 PROCEDURE — 85025 COMPLETE CBC W/AUTO DIFF WBC: CPT

## 2019-02-20 NOTE — PROGRESS NOTES
Adrián Chavez is a 26year old female. Patient presents with:  Consult: .inrtri 6    Subjective   HPI:   Patient complains of fatigue. She recently saw another physician, and was having issues with sleep.   Since starting on low-dose of alprazolam 0.5 m Disp: 30 tablet Rfl: 0   loratadine (CLARITIN) 10 MG Oral Tab Take 10 mg by mouth daily. Disp:  Rfl:      No current facility-administered medications on file prior to visit.       Past Medical History:   Diagnosis Date   • Anxiety 2/15/2019      Social His of pernicious anemia        We will conduct testing for anemia.     Relevant Orders    CBC WITH DIFFERENTIAL WITH PLATELET    VITAMIN R51    FOLIC ACID SERUM(FOLATE)    IRON AND TIBC    FERRITIN    INTRINSIC FACTOR ABS, SERUM    Psychophysiological insomnia

## 2019-02-22 ENCOUNTER — TELEPHONE (OUTPATIENT)
Dept: FAMILY MEDICINE CLINIC | Facility: CLINIC | Age: 34
End: 2019-02-22

## 2019-02-22 NOTE — TELEPHONE ENCOUNTER
CBC, folic acid ferritin Z02 are all normal the iron is not deficient she has sufficient iron.     Intrinsic factor antibody is still pending

## 2019-02-23 LAB — INTRINSIC FACTOR BLOCKING AB: NEGATIVE

## 2019-03-04 ENCOUNTER — PATIENT MESSAGE (OUTPATIENT)
Dept: FAMILY MEDICINE CLINIC | Facility: CLINIC | Age: 34
End: 2019-03-04

## 2019-03-04 DIAGNOSIS — F98.8 ATTENTION DEFICIT DISORDER (ADD) WITHOUT HYPERACTIVITY: ICD-10-CM

## 2019-03-05 NOTE — TELEPHONE ENCOUNTER
From: Wilfrido Vasquez  To: Frida Gonzales MD  Sent: 3/4/2019 7:51 PM CST  Subject: Prescription Question    I need a refill on adderall xr. Since this dose seems to not be working as effectively for me. I was wondering if I go up just a little bit?  If yo

## 2019-03-06 RX ORDER — DEXTROAMPHETAMINE SACCHARATE, AMPHETAMINE ASPARTATE MONOHYDRATE, DEXTROAMPHETAMINE SULFATE AND AMPHETAMINE SULFATE 3.75; 3.75; 3.75; 3.75 MG/1; MG/1; MG/1; MG/1
15 CAPSULE, EXTENDED RELEASE ORAL EVERY MORNING
Qty: 30 CAPSULE | Refills: 0 | Status: SHIPPED | OUTPATIENT
Start: 2019-03-06 | End: 2019-04-12

## 2019-03-06 NOTE — TELEPHONE ENCOUNTER
I would agree with increasing the dose. I have printed the prescription and she can come pick it up with higher at the higher dose.

## 2019-03-07 ENCOUNTER — MED REC SCAN ONLY (OUTPATIENT)
Dept: FAMILY MEDICINE CLINIC | Facility: CLINIC | Age: 34
End: 2019-03-07

## 2019-03-07 DIAGNOSIS — R53.83 OTHER FATIGUE: ICD-10-CM

## 2019-03-07 RX ORDER — CITALOPRAM 40 MG/1
TABLET ORAL
Qty: 90 TABLET | Refills: 0 | OUTPATIENT
Start: 2019-03-07

## 2019-04-11 ENCOUNTER — PATIENT MESSAGE (OUTPATIENT)
Dept: FAMILY MEDICINE CLINIC | Facility: CLINIC | Age: 34
End: 2019-04-11

## 2019-04-11 DIAGNOSIS — F98.8 ATTENTION DEFICIT DISORDER (ADD) WITHOUT HYPERACTIVITY: ICD-10-CM

## 2019-04-12 RX ORDER — DEXTROAMPHETAMINE SACCHARATE, AMPHETAMINE ASPARTATE MONOHYDRATE, DEXTROAMPHETAMINE SULFATE AND AMPHETAMINE SULFATE 3.75; 3.75; 3.75; 3.75 MG/1; MG/1; MG/1; MG/1
15 CAPSULE, EXTENDED RELEASE ORAL EVERY MORNING
Qty: 30 CAPSULE | Refills: 0 | Status: SHIPPED | OUTPATIENT
Start: 2019-04-12 | End: 2019-05-21

## 2019-04-12 NOTE — PROGRESS NOTES
Adderall XR refill request.     Last office visit: 2/20/2019  Last refill: 3/6/2019, #30  Labs due: 5/15/24613    No future appointments.

## 2019-04-12 NOTE — TELEPHONE ENCOUNTER
From: Darinel Meyer  To: Kevin Feliz MD  Sent: 4/11/2019 6:22 PM CDT  Subject: Referral Request    Need refill on adderall xr 15mg please and thank you.

## 2019-05-10 ENCOUNTER — MED REC SCAN ONLY (OUTPATIENT)
Dept: FAMILY MEDICINE CLINIC | Facility: CLINIC | Age: 34
End: 2019-05-10

## 2019-05-20 ENCOUNTER — PATIENT MESSAGE (OUTPATIENT)
Dept: FAMILY MEDICINE CLINIC | Facility: CLINIC | Age: 34
End: 2019-05-20

## 2019-05-21 ENCOUNTER — PATIENT MESSAGE (OUTPATIENT)
Dept: FAMILY MEDICINE CLINIC | Facility: CLINIC | Age: 34
End: 2019-05-21

## 2019-05-21 ENCOUNTER — OFFICE VISIT (OUTPATIENT)
Dept: FAMILY MEDICINE CLINIC | Facility: CLINIC | Age: 34
End: 2019-05-21
Payer: COMMERCIAL

## 2019-05-21 VITALS
TEMPERATURE: 97 F | SYSTOLIC BLOOD PRESSURE: 118 MMHG | WEIGHT: 239 LBS | HEIGHT: 67 IN | OXYGEN SATURATION: 98 % | BODY MASS INDEX: 37.51 KG/M2 | HEART RATE: 94 BPM | DIASTOLIC BLOOD PRESSURE: 80 MMHG

## 2019-05-21 DIAGNOSIS — F98.8 ATTENTION DEFICIT DISORDER (ADD) WITHOUT HYPERACTIVITY: ICD-10-CM

## 2019-05-21 PROCEDURE — 99213 OFFICE O/P EST LOW 20 MIN: CPT | Performed by: FAMILY MEDICINE

## 2019-05-21 RX ORDER — DEXTROAMPHETAMINE SACCHARATE, AMPHETAMINE ASPARTATE MONOHYDRATE, DEXTROAMPHETAMINE SULFATE AND AMPHETAMINE SULFATE 3.75; 3.75; 3.75; 3.75 MG/1; MG/1; MG/1; MG/1
15 CAPSULE, EXTENDED RELEASE ORAL EVERY MORNING
Qty: 30 CAPSULE | Refills: 0 | Status: SHIPPED | OUTPATIENT
Start: 2019-05-21 | End: 2019-06-20

## 2019-05-21 NOTE — TELEPHONE ENCOUNTER
From: Willard Vincent  To: Katrin Singer MD  Sent: 5/20/2019 8:30 PM CDT  Subject: Other    I need a refill for adderall.  I also feel that it is not working as well for me as it was at first. If I need to make a appointment to up the dosage, no problem

## 2019-05-21 NOTE — PROGRESS NOTES
Matilda Rosado is a 26year old female. Patient presents with:  Medication Follow-Up: . ........... inrm6    Chief Complaint Reviewed and Verified  Nursing Notes Reviewed and Verified  Tobacco Reviewed  Allergies Reviewed  Medications Reviewed  Problem L (SYNTHROID) 50 MCG Oral Tab Take 1 tablet by mouth daily. Disp: 30 tablet Rfl: 0     No current facility-administered medications on file prior to visit.       Past Medical History:   Diagnosis Date   • Anxiety 2/15/2019      Social History:  Social History or fail to improve, for medication review.           Meds & Refills for this Visit:  Requested Prescriptions     Signed Prescriptions Disp Refills   • Amphetamine-Dextroamphet ER 15 MG Oral Capsule SR 24 Hr 30 capsule 0     Sig: Take 1 capsule (15 mg total)

## 2019-05-21 NOTE — TELEPHONE ENCOUNTER
From: Franco Lawton  To: Chloe Kearney MD  Sent: 5/21/2019 11:06 AM CDT  Subject: Other    I will schedule a appointment. I have enough to last the week. Thanks!

## 2019-06-13 DIAGNOSIS — R53.83 OTHER FATIGUE: ICD-10-CM

## 2019-06-13 RX ORDER — CITALOPRAM 40 MG/1
TABLET ORAL
Qty: 90 TABLET | Refills: 0 | Status: SHIPPED | OUTPATIENT
Start: 2019-06-13 | End: 2019-08-27

## 2019-06-21 ENCOUNTER — PATIENT MESSAGE (OUTPATIENT)
Dept: FAMILY MEDICINE CLINIC | Facility: CLINIC | Age: 34
End: 2019-06-21

## 2019-06-21 DIAGNOSIS — F98.8 ATTENTION DEFICIT DISORDER (ADD) WITHOUT HYPERACTIVITY: ICD-10-CM

## 2019-06-21 RX ORDER — DEXTROAMPHETAMINE SACCHARATE, AMPHETAMINE ASPARTATE MONOHYDRATE, DEXTROAMPHETAMINE SULFATE AND AMPHETAMINE SULFATE 3.75; 3.75; 3.75; 3.75 MG/1; MG/1; MG/1; MG/1
15 CAPSULE, EXTENDED RELEASE ORAL DAILY
Qty: 30 CAPSULE | Refills: 0 | Status: SHIPPED | OUTPATIENT
Start: 2019-06-21 | End: 2019-07-21

## 2019-06-21 RX ORDER — DEXTROAMPHETAMINE SACCHARATE, AMPHETAMINE ASPARTATE MONOHYDRATE, DEXTROAMPHETAMINE SULFATE AND AMPHETAMINE SULFATE 3.75; 3.75; 3.75; 3.75 MG/1; MG/1; MG/1; MG/1
15 CAPSULE, EXTENDED RELEASE ORAL DAILY
Qty: 30 CAPSULE | Refills: 0 | Status: SHIPPED | OUTPATIENT
Start: 2019-07-21 | End: 2019-08-20

## 2019-06-21 RX ORDER — DEXTROAMPHETAMINE SACCHARATE, AMPHETAMINE ASPARTATE MONOHYDRATE, DEXTROAMPHETAMINE SULFATE AND AMPHETAMINE SULFATE 3.75; 3.75; 3.75; 3.75 MG/1; MG/1; MG/1; MG/1
15 CAPSULE, EXTENDED RELEASE ORAL DAILY
Qty: 30 CAPSULE | Refills: 0 | Status: SHIPPED | OUTPATIENT
Start: 2019-08-20 | End: 2019-09-19

## 2019-06-21 NOTE — TELEPHONE ENCOUNTER
From: Pramod [place]  To: Shruthi Ma MD  Sent: 6/21/2019 6:29 AM CDT  Subject: Prescription Question    I am out of my adderall and would like a refill please.    Thanks   Alana Sherman

## 2019-06-25 ENCOUNTER — PATIENT MESSAGE (OUTPATIENT)
Dept: FAMILY MEDICINE CLINIC | Facility: CLINIC | Age: 34
End: 2019-06-25

## 2019-06-25 RX ORDER — DEXTROAMPHETAMINE SACCHARATE, AMPHETAMINE ASPARTATE MONOHYDRATE, DEXTROAMPHETAMINE SULFATE AND AMPHETAMINE SULFATE 2.5; 2.5; 2.5; 2.5 MG/1; MG/1; MG/1; MG/1
10 CAPSULE, EXTENDED RELEASE ORAL DAILY
Qty: 30 CAPSULE | Refills: 0 | Status: SHIPPED | OUTPATIENT
Start: 2019-08-24 | End: 2019-06-28 | Stop reason: DRUGHIGH

## 2019-06-25 RX ORDER — DEXTROAMPHETAMINE SACCHARATE, AMPHETAMINE ASPARTATE MONOHYDRATE, DEXTROAMPHETAMINE SULFATE AND AMPHETAMINE SULFATE 2.5; 2.5; 2.5; 2.5 MG/1; MG/1; MG/1; MG/1
10 CAPSULE, EXTENDED RELEASE ORAL DAILY
Qty: 30 CAPSULE | Refills: 0 | Status: SHIPPED | OUTPATIENT
Start: 2019-06-25 | End: 2019-06-28

## 2019-06-25 RX ORDER — DEXTROAMPHETAMINE SACCHARATE, AMPHETAMINE ASPARTATE MONOHYDRATE, DEXTROAMPHETAMINE SULFATE AND AMPHETAMINE SULFATE 2.5; 2.5; 2.5; 2.5 MG/1; MG/1; MG/1; MG/1
10 CAPSULE, EXTENDED RELEASE ORAL DAILY
Qty: 30 CAPSULE | Refills: 0 | Status: SHIPPED | OUTPATIENT
Start: 2019-07-25 | End: 2019-06-28 | Stop reason: DRUGHIGH

## 2019-06-25 NOTE — PROGRESS NOTES
Last OV: 5/21/2019  Last filled: 1/23/2019 #30 no RF    Per 5/21/2019 OV notes  Visit Diagnoses      Attention deficit disorder (ADD) without hyperactivity         add 10 mg immediate release in afternoon to 15mg of extended release     Relevant Medication

## 2019-06-25 NOTE — TELEPHONE ENCOUNTER
From: Franco Lawton  To: Chloe Kearney MD  Sent: 6/25/2019 1:38 PM CDT  Subject: Prescription Question    I only have 3 10 mg tablets( the short acting ones). If I could get a refill on these that would be great.

## 2019-06-28 RX ORDER — DEXTROAMPHETAMINE SACCHARATE, AMPHETAMINE ASPARTATE, DEXTROAMPHETAMINE SULFATE AND AMPHETAMINE SULFATE 2.5; 2.5; 2.5; 2.5 MG/1; MG/1; MG/1; MG/1
10 TABLET ORAL EVERY EVENING
Qty: 30 TABLET | Refills: 0 | Status: SHIPPED | OUTPATIENT
Start: 2019-06-28 | End: 2020-04-03

## 2019-06-28 RX ORDER — DEXTROAMPHETAMINE SACCHARATE, AMPHETAMINE ASPARTATE, DEXTROAMPHETAMINE SULFATE AND AMPHETAMINE SULFATE 2.5; 2.5; 2.5; 2.5 MG/1; MG/1; MG/1; MG/1
10 TABLET ORAL EVERY EVENING
Qty: 30 TABLET | Refills: 0 | Status: SHIPPED | OUTPATIENT
Start: 2019-06-28 | End: 2019-07-28

## 2019-06-28 NOTE — PROGRESS NOTES
Pt picked up one script. Changes were needed. Dr Powell forgot to change the start date on the other two. Lazaro alarcon [phone].  Exp 8/21/21

## 2019-06-28 NOTE — PROGRESS NOTES
Patient reviewed prescriptions for the adderall XR and stated she needed the prescription for just adderall. Discussed with Dr. Brigido Ignacio, last office note reviewed. Dr. Brigido Ignacio changed the prescription for a 90 day panel.  In reviewing the panel prior to giv

## 2019-07-17 ENCOUNTER — TELEPHONE (OUTPATIENT)
Dept: FAMILY MEDICINE CLINIC | Facility: CLINIC | Age: 34
End: 2019-07-17

## 2019-07-17 NOTE — TELEPHONE ENCOUNTER
Left message for pt as I am not able to see letter  Left message on voicemail with office number and hours provided

## 2019-07-17 NOTE — TELEPHONE ENCOUNTER
Spoke with pt who states that she needs a letter from Dr. Juan Miguel Leach on letterhead that states you have been treating the patient for ADD and anxiety and due to the diagnoses it would be appropriate to provide patient with the following testing accomodations:

## 2019-07-17 NOTE — TELEPHONE ENCOUNTER
Pt said Dr. Malcolm Tipton wrote a letter for testing accommodations. She needs that letter again, I did see that under letters.  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

## 2019-08-27 DIAGNOSIS — R53.83 OTHER FATIGUE: ICD-10-CM

## 2019-08-27 RX ORDER — CITALOPRAM 40 MG/1
TABLET ORAL
Qty: 90 TABLET | Refills: 0 | Status: SHIPPED | OUTPATIENT
Start: 2019-08-27 | End: 2019-11-14

## 2019-09-03 ENCOUNTER — PATIENT MESSAGE (OUTPATIENT)
Dept: FAMILY MEDICINE CLINIC | Facility: CLINIC | Age: 34
End: 2019-09-03

## 2019-09-03 NOTE — TELEPHONE ENCOUNTER
From: Desmond Leblanc  To: Donita Combs MD  Sent: 9/3/2019 5:12 PM CDT  Subject: Prescription Question    I need a refill on adderall ER  please. The two month prescription was great.   Thanks

## 2019-09-05 NOTE — TELEPHONE ENCOUNTER
Spoke with pt who states that she gave back the adderall 15mg XR scripts when she was here in [place] as she only needed the 10mg tablets. Pt is requesting refill now.  Please see 6/25/2019 "[employer]t message

## 2019-09-05 NOTE — TELEPHONE ENCOUNTER
Patient has questions regarding her refill on her Adderall. Delle Aschoff, RN was working on this yesterday and she sent her a [facility] note but thinks that Delle Aschoff was looking at the wrong rx. Please call patient to discuss.

## 2019-09-06 RX ORDER — DEXTROAMPHETAMINE SACCHARATE, AMPHETAMINE ASPARTATE MONOHYDRATE, DEXTROAMPHETAMINE SULFATE AND AMPHETAMINE SULFATE 1.25; 1.25; 1.25; 1.25 MG/1; MG/1; MG/1; MG/1
5 CAPSULE, EXTENDED RELEASE ORAL DAILY
Qty: 30 CAPSULE | Refills: 0 | Status: SHIPPED | OUTPATIENT
Start: 2019-09-06 | End: 2019-09-10 | Stop reason: CLARIF

## 2019-09-06 RX ORDER — DEXTROAMPHETAMINE SACCHARATE, AMPHETAMINE ASPARTATE MONOHYDRATE, DEXTROAMPHETAMINE SULFATE AND AMPHETAMINE SULFATE 1.25; 1.25; 1.25; 1.25 MG/1; MG/1; MG/1; MG/1
5 CAPSULE, EXTENDED RELEASE ORAL DAILY
Qty: 30 CAPSULE | Refills: 0 | Status: SHIPPED | OUTPATIENT
Start: 2019-11-06 | End: 2019-09-10 | Stop reason: CLARIF

## 2019-09-06 RX ORDER — DEXTROAMPHETAMINE SACCHARATE, AMPHETAMINE ASPARTATE MONOHYDRATE, DEXTROAMPHETAMINE SULFATE AND AMPHETAMINE SULFATE 1.25; 1.25; 1.25; 1.25 MG/1; MG/1; MG/1; MG/1
5 CAPSULE, EXTENDED RELEASE ORAL DAILY
Qty: 30 CAPSULE | Refills: 0 | Status: SHIPPED | OUTPATIENT
Start: 2019-10-06 | End: 2019-09-10 | Stop reason: CLARIF

## 2019-09-06 NOTE — TELEPHONE ENCOUNTER
Message left on detailed voicemail that \"scripts are available for . Please contact our office with any questions or concerns\". Script placed at .

## 2019-09-06 NOTE — TELEPHONE ENCOUNTER
For technical reasons---today this needed to be printed and cannot be electronically sent    Scrip printed and available--parveen

## 2019-09-10 ENCOUNTER — TELEPHONE (OUTPATIENT)
Dept: FAMILY MEDICINE CLINIC | Facility: CLINIC | Age: 34
End: 2019-09-10

## 2019-09-10 RX ORDER — DEXTROAMPHETAMINE SACCHARATE, AMPHETAMINE ASPARTATE MONOHYDRATE, DEXTROAMPHETAMINE SULFATE AND AMPHETAMINE SULFATE 3.75; 3.75; 3.75; 3.75 MG/1; MG/1; MG/1; MG/1
15 CAPSULE, EXTENDED RELEASE ORAL DAILY
Qty: 30 CAPSULE | Refills: 0 | Status: SHIPPED | OUTPATIENT
Start: 2019-10-11 | End: 2019-11-10

## 2019-09-10 RX ORDER — DEXTROAMPHETAMINE SACCHARATE, AMPHETAMINE ASPARTATE MONOHYDRATE, DEXTROAMPHETAMINE SULFATE AND AMPHETAMINE SULFATE 3.75; 3.75; 3.75; 3.75 MG/1; MG/1; MG/1; MG/1
15 CAPSULE, EXTENDED RELEASE ORAL DAILY
Qty: 30 CAPSULE | Refills: 0 | Status: SHIPPED | OUTPATIENT
Start: 2019-11-11 | End: 2020-01-22

## 2019-09-10 RX ORDER — DEXTROAMPHETAMINE SACCHARATE, AMPHETAMINE ASPARTATE MONOHYDRATE, DEXTROAMPHETAMINE SULFATE AND AMPHETAMINE SULFATE 3.75; 3.75; 3.75; 3.75 MG/1; MG/1; MG/1; MG/1
15 CAPSULE, EXTENDED RELEASE ORAL DAILY
Qty: 30 CAPSULE | Refills: 0 | Status: SHIPPED | OUTPATIENT
Start: 2019-09-10 | End: 2019-10-10

## 2019-09-10 NOTE — TELEPHONE ENCOUNTER
LAZ JUST PICKED UP HER SCRIPTS, SHE SAID SHE IS SUPPOSE TO BE ON 15MG NOT 5MG. SHE IS BRINGING THE SCRIPTS BACK TO GET NEW ONES.

## 2019-11-13 ENCOUNTER — OFFICE VISIT (OUTPATIENT)
Dept: FAMILY MEDICINE CLINIC | Facility: CLINIC | Age: 34
End: 2019-11-13
Payer: COMMERCIAL

## 2019-11-13 VITALS
TEMPERATURE: 97 F | HEART RATE: 80 BPM | DIASTOLIC BLOOD PRESSURE: 80 MMHG | HEIGHT: 67 IN | RESPIRATION RATE: 12 BRPM | SYSTOLIC BLOOD PRESSURE: 118 MMHG | BODY MASS INDEX: 37.67 KG/M2 | WEIGHT: 240 LBS

## 2019-11-13 DIAGNOSIS — J01.90 ACUTE RHINOSINUSITIS: Primary | ICD-10-CM

## 2019-11-13 PROCEDURE — 99213 OFFICE O/P EST LOW 20 MIN: CPT | Performed by: FAMILY MEDICINE

## 2019-11-13 RX ORDER — AMOXICILLIN AND CLAVULANATE POTASSIUM 875; 125 MG/1; MG/1
1 TABLET, FILM COATED ORAL 2 TIMES DAILY
Qty: 20 TABLET | Refills: 0 | Status: SHIPPED | OUTPATIENT
Start: 2019-11-13 | End: 2019-11-23

## 2019-11-13 RX ORDER — PREDNISONE 20 MG/1
TABLET ORAL
Qty: 30 TABLET | Refills: 0 | Status: SHIPPED | OUTPATIENT
Start: 2019-11-13 | End: 2019-12-06 | Stop reason: ALTCHOICE

## 2019-11-13 NOTE — PROGRESS NOTES
Patient presents with:  Cough: inrm 5    Chief Complaint Reviewed and Verified  Nursing Notes Reviewed and   Verified  Tobacco Reviewed  Allergies Reviewed  Medications Reviewed    Problem List Reviewed  Medical History Reviewed  Surgical History   Reviewe levothyroxine (SYNTHROID) 50 MCG Oral Tab Take 1 tablet by mouth daily.  30 tablet 0      Past Medical History:   Diagnosis Date   • Anxiety 2/15/2019      Past Surgical History:   Procedure Laterality Date   • OTHER SURGICAL HISTORY Left 9/2007    knee art Prescriptions Disp Refills   • Amoxicillin-Pot Clavulanate 875-125 MG Oral Tab 20 tablet 0     Sig: Take 1 tablet by mouth 2 (two) times daily for 10 days.    • predniSONE 20 MG Oral Tab 30 tablet 0     Sig: 3 tabs daily 5 days 2 tabs daily 5 days 1 tab arron

## 2019-11-13 NOTE — PATIENT INSTRUCTIONS
Amoxicillin; Clavulanic Acid tablets  Brand Name: Augmentin  What is this medicine? AMOXICILLIN; CLAVULANIC ACID (a mox i CECILIA in; ABHISHEK dickey ic AS id) is a penicillin antibiotic. It is used to treat certain kinds of bacterial infections.  It will not w If you miss a dose, take it as soon as you can. If it is almost time for your next dose, take only that dose. Do not take double or extra doses. Where should I keep my medicine? Keep out of the reach of children.   Store at room temperature below 25 degre

## 2019-11-14 DIAGNOSIS — R53.83 OTHER FATIGUE: ICD-10-CM

## 2019-11-14 RX ORDER — CITALOPRAM 40 MG/1
TABLET ORAL
Qty: 90 TABLET | Refills: 1 | Status: SHIPPED | OUTPATIENT
Start: 2019-11-14 | End: 2020-05-27

## 2019-12-06 ENCOUNTER — OFFICE VISIT (OUTPATIENT)
Dept: FAMILY MEDICINE CLINIC | Facility: CLINIC | Age: 34
End: 2019-12-06
Payer: COMMERCIAL

## 2019-12-06 VITALS
OXYGEN SATURATION: 97 % | SYSTOLIC BLOOD PRESSURE: 118 MMHG | HEART RATE: 94 BPM | DIASTOLIC BLOOD PRESSURE: 80 MMHG | TEMPERATURE: 97 F

## 2019-12-06 DIAGNOSIS — J01.90 ACUTE RHINOSINUSITIS: Primary | ICD-10-CM

## 2019-12-06 PROCEDURE — 99213 OFFICE O/P EST LOW 20 MIN: CPT | Performed by: FAMILY MEDICINE

## 2019-12-06 RX ORDER — PREDNISONE 20 MG/1
TABLET ORAL
Qty: 30 TABLET | Refills: 0 | Status: SHIPPED | OUTPATIENT
Start: 2019-12-06 | End: 2020-03-02 | Stop reason: ALTCHOICE

## 2019-12-06 RX ORDER — SPIRONOLACTONE 100 MG/1
TABLET, FILM COATED ORAL
COMMUNITY
Start: 2019-11-29

## 2019-12-06 RX ORDER — AMOXICILLIN AND CLAVULANATE POTASSIUM 875; 125 MG/1; MG/1
1 TABLET, FILM COATED ORAL 2 TIMES DAILY
Qty: 20 TABLET | Refills: 0 | Status: SHIPPED | OUTPATIENT
Start: 2019-12-06 | End: 2019-12-16

## 2019-12-06 NOTE — PROGRESS NOTES
Patient presents with:  Sinus Problem: treeth hurt. no fever. ribs hurt. ribs hurt from coughing.  in room 4    Chief Complaint Reviewed and Verified  Nursing Notes Reviewed and   Verified  Tobacco Reviewed  Allergies Reviewed  Medications Reviewed    Probl 2/15/2019      Past Surgical History:   Procedure Laterality Date   • OTHER SURGICAL HISTORY Left 9/2007    knee arthroscopy meniscus      Family History   Problem Relation Age of Onset   • Other (Other) Mother         anemia      Social History    Tobacco Imaging & Consults:  None    Return if symptoms worsen or fail to improve.     Aneta Newman M.D., FAAFP

## 2020-01-22 ENCOUNTER — PATIENT MESSAGE (OUTPATIENT)
Dept: FAMILY MEDICINE CLINIC | Facility: CLINIC | Age: 35
End: 2020-01-22

## 2020-01-22 RX ORDER — DEXTROAMPHETAMINE SACCHARATE, AMPHETAMINE ASPARTATE MONOHYDRATE, DEXTROAMPHETAMINE SULFATE AND AMPHETAMINE SULFATE 3.75; 3.75; 3.75; 3.75 MG/1; MG/1; MG/1; MG/1
15 CAPSULE, EXTENDED RELEASE ORAL DAILY
Qty: 30 CAPSULE | Refills: 0 | Status: SHIPPED | OUTPATIENT
Start: 2020-01-22 | End: 2020-04-02

## 2020-01-22 NOTE — TELEPHONE ENCOUNTER
From: Franco Lawton  To: Angel Ya MD  Sent: 1/22/2020 7:32 AM CST  Subject: Other    Need refill on adderall xr 15 mg please. Please call me if I need to come  the script. [facility] [place]. Thank you.

## 2020-01-24 ENCOUNTER — TELEPHONE (OUTPATIENT)
Dept: FAMILY MEDICINE CLINIC | Facility: CLINIC | Age: 35
End: 2020-01-24

## 2020-01-24 NOTE — TELEPHONE ENCOUNTER
Spoke with pharmacy and they wanted to clarify that it was ok for patient to  script today. He also wanted to clarify the ordered stated Fill prescription in 61 days\". Informed pharmacist I believe this is an older order that was not removed.  He ve

## 2020-03-02 ENCOUNTER — OFFICE VISIT (OUTPATIENT)
Dept: FAMILY MEDICINE CLINIC | Facility: CLINIC | Age: 35
End: 2020-03-02
Payer: COMMERCIAL

## 2020-03-02 VITALS
SYSTOLIC BLOOD PRESSURE: 110 MMHG | RESPIRATION RATE: 12 BRPM | HEIGHT: 67 IN | DIASTOLIC BLOOD PRESSURE: 80 MMHG | BODY MASS INDEX: 38.3 KG/M2 | HEART RATE: 72 BPM | WEIGHT: 244 LBS | TEMPERATURE: 97 F

## 2020-03-02 DIAGNOSIS — G47.26 SHIFT WORK SLEEP DISORDER: Primary | ICD-10-CM

## 2020-03-02 PROCEDURE — 99213 OFFICE O/P EST LOW 20 MIN: CPT | Performed by: FAMILY MEDICINE

## 2020-03-02 RX ORDER — TRAZODONE HYDROCHLORIDE 100 MG/1
100 TABLET ORAL NIGHTLY
Qty: 30 TABLET | Refills: 2 | Status: SHIPPED | OUTPATIENT
Start: 2020-03-02 | End: 2020-05-31

## 2020-03-02 NOTE — PROGRESS NOTES
Candis Justin is a 37year old female. Patient presents with:  Sleep Problem: inrm.  6      Chief Complaint Reviewed and Verified  Nursing Notes Reviewed and   Verified  Tobacco Reviewed  Allergies Reviewed  Medications Reviewed    Problem List Reviewe tobacco: Never Used    Alcohol use: No    Drug use: No       BP Readings from Last 6 Encounters:  03/02/20 : 110/80  12/06/19 : 118/80  11/13/19 : 118/80  05/21/19 : 118/80  02/20/19 : 110/64  02/15/19 : 110/80      Wt Readings from Last 6 Encounters:  03/

## 2020-03-31 ENCOUNTER — PATIENT MESSAGE (OUTPATIENT)
Dept: FAMILY MEDICINE CLINIC | Facility: CLINIC | Age: 35
End: 2020-03-31

## 2020-03-31 NOTE — TELEPHONE ENCOUNTER
From: Casper Senior  To: Christopher lFor MD  Sent: 3/31/2020 5:51 PM CDT  Subject: Referral Request    I am in need of both adderall po 15 mg xr and amphetamine salts 10mg po. Hope this finds you well!  Thanks

## 2020-04-02 ENCOUNTER — PATIENT MESSAGE (OUTPATIENT)
Dept: FAMILY MEDICINE CLINIC | Facility: CLINIC | Age: 35
End: 2020-04-02

## 2020-04-02 RX ORDER — DEXTROAMPHETAMINE SACCHARATE, AMPHETAMINE ASPARTATE MONOHYDRATE, DEXTROAMPHETAMINE SULFATE AND AMPHETAMINE SULFATE 3.75; 3.75; 3.75; 3.75 MG/1; MG/1; MG/1; MG/1
15 CAPSULE, EXTENDED RELEASE ORAL DAILY
Qty: 30 CAPSULE | Refills: 0 | Status: SHIPPED | OUTPATIENT
Start: 2020-04-02 | End: 2020-06-23

## 2020-04-02 NOTE — TELEPHONE ENCOUNTER
Filled the Adderall 15 x-ray   Await response from patient on the other dosage   Will close note for now

## 2020-04-02 NOTE — TELEPHONE ENCOUNTER
From: Babs Mendez  To: Melody Armijo MD  Sent: 4/2/2020 10:07 AM CDT  Subject: Referral Request    Yes 15 mg XR in AM. 10 mg in the afternoon.

## 2020-04-03 ENCOUNTER — TELEPHONE (OUTPATIENT)
Dept: FAMILY MEDICINE CLINIC | Facility: CLINIC | Age: 35
End: 2020-04-03

## 2020-04-03 DIAGNOSIS — G47.26 SHIFT WORK SLEEP DISORDER: Primary | ICD-10-CM

## 2020-04-03 RX ORDER — DEXTROAMPHETAMINE SACCHARATE, AMPHETAMINE ASPARTATE, DEXTROAMPHETAMINE SULFATE AND AMPHETAMINE SULFATE 2.5; 2.5; 2.5; 2.5 MG/1; MG/1; MG/1; MG/1
10 TABLET ORAL EVERY EVENING
Qty: 30 TABLET | Refills: 0 | Status: SHIPPED | OUTPATIENT
Start: 2020-04-03 | End: 2020-08-17

## 2020-05-07 ENCOUNTER — MED REC SCAN ONLY (OUTPATIENT)
Dept: FAMILY MEDICINE CLINIC | Facility: CLINIC | Age: 35
End: 2020-05-07

## 2020-05-27 DIAGNOSIS — R53.83 OTHER FATIGUE: ICD-10-CM

## 2020-05-27 RX ORDER — CITALOPRAM 40 MG/1
TABLET ORAL
Qty: 90 TABLET | Refills: 1 | Status: SHIPPED | OUTPATIENT
Start: 2020-05-27 | End: 2021-01-11

## 2020-06-07 ENCOUNTER — PATIENT MESSAGE (OUTPATIENT)
Dept: FAMILY MEDICINE CLINIC | Facility: CLINIC | Age: 35
End: 2020-06-07

## 2020-06-07 DIAGNOSIS — G47.26 SHIFT WORK SLEEP DISORDER: Primary | ICD-10-CM

## 2020-06-07 NOTE — TELEPHONE ENCOUNTER
From: Abiel Stevens  To: Deidre Avalos MD  Sent: 6/7/2020 12:08 AM CDT  Subject: Non-Urgent Medical Question    Since working nights. I need something for sleep. I have a prescription for trazadone already and that is not working.  I take 25 mg of jaren

## 2020-06-09 RX ORDER — TEMAZEPAM 15 MG/1
15 CAPSULE ORAL NIGHTLY PRN
Qty: 30 CAPSULE | Refills: 0 | Status: SHIPPED | OUTPATIENT
Start: 2020-06-09

## 2020-06-23 RX ORDER — DEXTROAMPHETAMINE SACCHARATE, AMPHETAMINE ASPARTATE MONOHYDRATE, DEXTROAMPHETAMINE SULFATE AND AMPHETAMINE SULFATE 3.75; 3.75; 3.75; 3.75 MG/1; MG/1; MG/1; MG/1
15 CAPSULE, EXTENDED RELEASE ORAL DAILY
Qty: 30 CAPSULE | Refills: 0 | Status: SHIPPED | OUTPATIENT
Start: 2020-06-23 | End: 2020-08-17

## 2020-06-23 NOTE — TELEPHONE ENCOUNTER
LOV:  3/2/2020     LAB:    2019     LRX:    amphetamine-dextroamphetamine (ADDERALL) 10 MG Oral Tab () 30 tablet 0 refill 4/3/2020    NOV:   No future appointments.     PROTOCOL:    Amphetamine-Dextroamphet ER (ADDERALL XR) 15 MG Oral Capsule SR

## 2020-08-17 ENCOUNTER — OFFICE VISIT (OUTPATIENT)
Dept: FAMILY MEDICINE CLINIC | Facility: CLINIC | Age: 35
End: 2020-08-17
Payer: COMMERCIAL

## 2020-08-17 VITALS
HEART RATE: 80 BPM | WEIGHT: 238 LBS | OXYGEN SATURATION: 98 % | HEIGHT: 67 IN | DIASTOLIC BLOOD PRESSURE: 80 MMHG | SYSTOLIC BLOOD PRESSURE: 110 MMHG | TEMPERATURE: 98 F | BODY MASS INDEX: 37.35 KG/M2 | RESPIRATION RATE: 12 BRPM

## 2020-08-17 DIAGNOSIS — G47.26 SHIFT WORK SLEEP DISORDER: ICD-10-CM

## 2020-08-17 DIAGNOSIS — R42 DIZZINESS: Primary | ICD-10-CM

## 2020-08-17 PROCEDURE — 3079F DIAST BP 80-89 MM HG: CPT | Performed by: FAMILY MEDICINE

## 2020-08-17 PROCEDURE — 3074F SYST BP LT 130 MM HG: CPT | Performed by: FAMILY MEDICINE

## 2020-08-17 PROCEDURE — 3008F BODY MASS INDEX DOCD: CPT | Performed by: FAMILY MEDICINE

## 2020-08-17 PROCEDURE — 99214 OFFICE O/P EST MOD 30 MIN: CPT | Performed by: FAMILY MEDICINE

## 2020-08-17 RX ORDER — DEXTROAMPHETAMINE SACCHARATE, AMPHETAMINE ASPARTATE MONOHYDRATE, DEXTROAMPHETAMINE SULFATE AND AMPHETAMINE SULFATE 3.75; 3.75; 3.75; 3.75 MG/1; MG/1; MG/1; MG/1
15 CAPSULE, EXTENDED RELEASE ORAL DAILY
Qty: 30 CAPSULE | Refills: 0 | Status: SHIPPED | OUTPATIENT
Start: 2020-08-17 | End: 2020-10-29

## 2020-08-17 RX ORDER — DEXTROAMPHETAMINE SACCHARATE, AMPHETAMINE ASPARTATE, DEXTROAMPHETAMINE SULFATE AND AMPHETAMINE SULFATE 2.5; 2.5; 2.5; 2.5 MG/1; MG/1; MG/1; MG/1
10 TABLET ORAL EVERY EVENING
Qty: 30 TABLET | Refills: 0 | Status: SHIPPED | OUTPATIENT
Start: 2020-08-17 | End: 2021-03-25

## 2020-08-17 NOTE — PROGRESS NOTES
Carroll Serrano is a 37year old female. Patient presents with:  Dizziness: going for 5months .  inrm 5      Chief Complaint Reviewed and Verified  Nursing Notes Reviewed and   Verified  Tobacco Reviewed  Allergies Reviewed  Medications Reviewed    Probl : 118/80  11/13/19 : 118/80  05/21/19 : 118/80 02/20/19 : 110/64      Wt Readings from Last 6 Encounters:  08/17/20 : 238 lb (108 kg)  03/02/20 : 244 lb (110.7 kg)  11/13/19 : 240 lb (108.9 kg)  05/21/19 : 239 lb (108.4 kg)  02/20/19 : 239 lb 6 oz (108.6 Capsule SR 24 Hr 30 capsule 0     Sig: Take 1 capsule (15 mg total) by mouth daily. • amphetamine-dextroamphetamine (ADDERALL) 10 MG Oral Tab 30 tablet 0     Sig: Take 1 tablet (10 mg total) by mouth every evening.        Johnson Perez M.D., [facility]

## 2020-10-29 DIAGNOSIS — G47.26 SHIFT WORK SLEEP DISORDER: ICD-10-CM

## 2020-10-29 RX ORDER — DEXTROAMPHETAMINE SACCHARATE, AMPHETAMINE ASPARTATE MONOHYDRATE, DEXTROAMPHETAMINE SULFATE AND AMPHETAMINE SULFATE 3.75; 3.75; 3.75; 3.75 MG/1; MG/1; MG/1; MG/1
15 CAPSULE, EXTENDED RELEASE ORAL DAILY
Qty: 30 CAPSULE | Refills: 0 | Status: SHIPPED | OUTPATIENT
Start: 2020-10-29 | End: 2021-01-11

## 2020-10-29 NOTE — TELEPHONE ENCOUNTER
Last office visit: 5/21/19. Has been seen multiple times since then for other issues. Last refill: 8/17/20  Last labs: 2/20/19  No future appointments.

## 2020-12-09 ENCOUNTER — MED REC SCAN ONLY (OUTPATIENT)
Dept: FAMILY MEDICINE CLINIC | Facility: CLINIC | Age: 35
End: 2020-12-09

## 2020-12-10 ENCOUNTER — TELEPHONE (OUTPATIENT)
Dept: FAMILY MEDICINE CLINIC | Facility: CLINIC | Age: 35
End: 2020-12-10

## 2020-12-10 NOTE — TELEPHONE ENCOUNTER
Received patient's COVID results and she did test positive. Message left for patient to call back, awaiting response.

## 2021-01-09 DIAGNOSIS — R53.83 OTHER FATIGUE: ICD-10-CM

## 2021-01-09 DIAGNOSIS — G47.26 SHIFT WORK SLEEP DISORDER: ICD-10-CM

## 2021-01-09 NOTE — TELEPHONE ENCOUNTER
[facility] [place]  Pt had a ov in august.

## 2021-01-09 NOTE — TELEPHONE ENCOUNTER
LOV 08/17/2020    LAST LAB  02/20/2019    LAST RX  adderall #30 R0 10/29/2020    Next OV No future appointments.       PROTOCOL

## 2021-01-09 NOTE — TELEPHONE ENCOUNTER
LOV 08/17/2020    LAST LAB  02/20/2019    LAST RX  Citalopram #90 R1 05/27/2020    Next OV No future appointments.       PROTOCOL

## 2021-01-11 RX ORDER — DEXTROAMPHETAMINE SACCHARATE, AMPHETAMINE ASPARTATE MONOHYDRATE, DEXTROAMPHETAMINE SULFATE AND AMPHETAMINE SULFATE 3.75; 3.75; 3.75; 3.75 MG/1; MG/1; MG/1; MG/1
15 CAPSULE, EXTENDED RELEASE ORAL DAILY
Qty: 30 CAPSULE | Refills: 0 | Status: SHIPPED | OUTPATIENT
Start: 2021-01-11 | End: 2021-03-25

## 2021-01-11 RX ORDER — CITALOPRAM 40 MG/1
40 TABLET ORAL DAILY
Qty: 90 TABLET | Refills: 1 | Status: SHIPPED | OUTPATIENT
Start: 2021-01-11 | End: 2021-06-09

## 2021-01-22 ENCOUNTER — OFFICE VISIT (OUTPATIENT)
Dept: FAMILY MEDICINE CLINIC | Facility: CLINIC | Age: 36
End: 2021-01-22
Payer: COMMERCIAL

## 2021-01-22 VITALS
BODY MASS INDEX: 38 KG/M2 | DIASTOLIC BLOOD PRESSURE: 82 MMHG | RESPIRATION RATE: 14 BRPM | HEART RATE: 82 BPM | TEMPERATURE: 97 F | WEIGHT: 245 LBS | SYSTOLIC BLOOD PRESSURE: 124 MMHG

## 2021-01-22 DIAGNOSIS — Z12.31 ENCOUNTER FOR SCREENING MAMMOGRAM FOR BREAST CANCER: Primary | ICD-10-CM

## 2021-01-22 DIAGNOSIS — N60.01 BREAST CYST, RIGHT: ICD-10-CM

## 2021-01-22 PROCEDURE — 3074F SYST BP LT 130 MM HG: CPT | Performed by: FAMILY MEDICINE

## 2021-01-22 PROCEDURE — 99214 OFFICE O/P EST MOD 30 MIN: CPT | Performed by: FAMILY MEDICINE

## 2021-01-22 PROCEDURE — 3079F DIAST BP 80-89 MM HG: CPT | Performed by: FAMILY MEDICINE

## 2021-01-22 NOTE — PROGRESS NOTES
Casper Senior is a 38year old female. Patient presents with:  Breast Pain: right x 1 1/2 weeks.  comes and goes      Chief Complaint Reviewed and Verified  Nursing Notes Reviewed and   Verified  Tobacco Reviewed  Allergies Reviewed  Medications Review Used    Alcohol use: No    Drug use: No       BP Readings from Last 6 Encounters:  01/22/21 : 124/82  08/17/20 : 110/80  03/02/20 : 110/80  12/06/19 : 118/80  11/13/19 : 118/80  05/21/19 : 118/80      Wt Readings from Last 6 Encounters:  01/22/21 : 245 lb

## 2021-01-30 ENCOUNTER — TELEPHONE (OUTPATIENT)
Dept: FAMILY MEDICINE CLINIC | Facility: CLINIC | Age: 36
End: 2021-01-30

## 2021-01-30 NOTE — TELEPHONE ENCOUNTER
Detailed message left stating to call back on Monday. Results on Dr. Maureen Pedraza desk for review.

## 2021-05-25 ENCOUNTER — OFFICE VISIT (OUTPATIENT)
Dept: FAMILY MEDICINE CLINIC | Facility: CLINIC | Age: 36
End: 2021-05-25
Payer: COMMERCIAL

## 2021-05-25 VITALS
TEMPERATURE: 98 F | RESPIRATION RATE: 12 BRPM | OXYGEN SATURATION: 97 % | BODY MASS INDEX: 38.18 KG/M2 | HEIGHT: 67 IN | DIASTOLIC BLOOD PRESSURE: 70 MMHG | HEART RATE: 82 BPM | WEIGHT: 243.25 LBS | SYSTOLIC BLOOD PRESSURE: 108 MMHG

## 2021-05-25 DIAGNOSIS — F98.8 ATTENTION DEFICIT DISORDER (ADD) WITHOUT HYPERACTIVITY: ICD-10-CM

## 2021-05-25 DIAGNOSIS — R10.12 LEFT UPPER QUADRANT ABDOMINAL PAIN: Primary | ICD-10-CM

## 2021-05-25 PROCEDURE — 99213 OFFICE O/P EST LOW 20 MIN: CPT | Performed by: FAMILY MEDICINE

## 2021-05-25 PROCEDURE — 3008F BODY MASS INDEX DOCD: CPT | Performed by: FAMILY MEDICINE

## 2021-05-25 PROCEDURE — 3078F DIAST BP <80 MM HG: CPT | Performed by: FAMILY MEDICINE

## 2021-05-25 PROCEDURE — 3074F SYST BP LT 130 MM HG: CPT | Performed by: FAMILY MEDICINE

## 2021-05-25 RX ORDER — DICYCLOMINE HYDROCHLORIDE 10 MG/1
10 CAPSULE ORAL
Qty: 30 CAPSULE | Refills: 0 | Status: SHIPPED | OUTPATIENT
Start: 2021-05-25 | End: 2021-06-04

## 2021-05-25 RX ORDER — DEXTROAMPHETAMINE SACCHARATE, AMPHETAMINE ASPARTATE MONOHYDRATE, DEXTROAMPHETAMINE SULFATE AND AMPHETAMINE SULFATE 3.75; 3.75; 3.75; 3.75 MG/1; MG/1; MG/1; MG/1
15 CAPSULE, EXTENDED RELEASE ORAL DAILY
COMMUNITY
Start: 2020-08-17 | End: 2021-05-25

## 2021-05-25 RX ORDER — DEXTROAMPHETAMINE SACCHARATE, AMPHETAMINE ASPARTATE MONOHYDRATE, DEXTROAMPHETAMINE SULFATE AND AMPHETAMINE SULFATE 3.75; 3.75; 3.75; 3.75 MG/1; MG/1; MG/1; MG/1
15 CAPSULE, EXTENDED RELEASE ORAL DAILY
Qty: 30 CAPSULE | Refills: 0 | Status: SHIPPED | OUTPATIENT
Start: 2021-05-25 | End: 2021-05-29 | Stop reason: ALTCHOICE

## 2021-05-26 ENCOUNTER — TELEPHONE (OUTPATIENT)
Dept: FAMILY MEDICINE CLINIC | Facility: CLINIC | Age: 36
End: 2021-05-26

## 2021-05-26 RX ORDER — DEXTROAMPHETAMINE SACCHARATE, AMPHETAMINE ASPARTATE, DEXTROAMPHETAMINE SULFATE AND AMPHETAMINE SULFATE 5; 5; 5; 5 MG/1; MG/1; MG/1; MG/1
20 TABLET ORAL DAILY
Qty: 30 TABLET | Refills: 0 | Status: SHIPPED | OUTPATIENT
Start: 2021-05-26 | End: 2021-05-29

## 2021-05-26 RX ORDER — PANTOPRAZOLE SODIUM 40 MG/1
40 TABLET, DELAYED RELEASE ORAL
Qty: 30 TABLET | Refills: 0 | Status: SHIPPED | OUTPATIENT
Start: 2021-05-26 | End: 2021-06-25

## 2021-05-26 NOTE — TELEPHONE ENCOUNTER
Started prior [facility]. Called patient to get more information on if she has had to pay out of pocket before. Her coverage does not cover for Adderall XR.

## 2021-05-26 NOTE — TELEPHONE ENCOUNTER
[facility] this medication is covered. Appears to need a prior auth. Patient also states she needs something to help the pain in her abdomen. 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 Prescription loaded.

## 2021-05-26 NOTE — PROGRESS NOTES
HPI/Subjective:   Mae Hermosillo is a 38year old female who presents for Abdominal Pain (LUQ . ... Maddi Nix inrm 5)     Patient has had abdomen pain the last several days    No emesis no nausea  She states it is quick  Several seconds and very uncomfortable--she heartburn      Objective:   /70 (BP Location: Right arm, Patient Position: Sitting, Cuff Size: large)   Pulse 82   Temp 97.5 °F (36.4 °C) (Temporal)   Resp 12   Ht 5' 7\" (1.702 m)   Wt 243 lb 4 oz (110.3 kg)   SpO2 97%   BMI 38.10 kg/m²  Estimated b

## 2021-05-29 ENCOUNTER — TELEPHONE (OUTPATIENT)
Dept: FAMILY MEDICINE CLINIC | Facility: CLINIC | Age: 36
End: 2021-05-29

## 2021-05-29 RX ORDER — DEXTROAMPHETAMINE SACCHARATE, AMPHETAMINE ASPARTATE, DEXTROAMPHETAMINE SULFATE AND AMPHETAMINE SULFATE 5; 5; 5; 5 MG/1; MG/1; MG/1; MG/1
20 TABLET ORAL DAILY
Qty: 30 TABLET | Refills: 0 | Status: SHIPPED | OUTPATIENT
Start: 2021-05-29 | End: 2021-06-28

## 2021-05-29 NOTE — TELEPHONE ENCOUNTER
Jonathan vazquez from [facility] called to state they did not receive the script for Adderall 20 mg. Will resend again.  [facility] but should be [facility].

## 2021-05-29 NOTE — TELEPHONE ENCOUNTER
[facility] [place] [facility] about the Prior [facility] on Amphetamine-Dextroamphet ER (ADDERALL XR) 15 MG Oral Capsule SR please call with status

## 2021-06-09 DIAGNOSIS — R53.83 OTHER FATIGUE: ICD-10-CM

## 2021-06-09 RX ORDER — CITALOPRAM 40 MG/1
TABLET ORAL
Qty: 90 TABLET | Refills: 1 | Status: SHIPPED | OUTPATIENT
Start: 2021-06-09 | End: 2021-12-22

## 2021-06-09 NOTE — TELEPHONE ENCOUNTER
Last refill: 1/11/21 #90 w/ 1 refills    Last OV: 5/25/21  Last labs: 2019    No future appointments.

## 2021-12-22 DIAGNOSIS — R53.83 OTHER FATIGUE: ICD-10-CM

## 2021-12-22 RX ORDER — CITALOPRAM 40 MG/1
TABLET ORAL
Qty: 90 TABLET | Refills: 0 | Status: SHIPPED | OUTPATIENT
Start: 2021-12-22

## 2021-12-22 NOTE — TELEPHONE ENCOUNTER
LOV 01/22/2021    LAST LAB 2020    LAST RX  Citalopram #90 R1 06/09/2021    Next OV No future appointments.     PROTOCOL

## 2022-02-24 ENCOUNTER — MED REC SCAN ONLY (OUTPATIENT)
Dept: FAMILY MEDICINE CLINIC | Facility: CLINIC | Age: 37
End: 2022-02-24

## 2022-03-22 RX ORDER — CITALOPRAM 40 MG/1
TABLET ORAL
Qty: 90 TABLET | Refills: 0 | Status: SHIPPED | OUTPATIENT
Start: 2022-03-22

## 2022-04-27 ENCOUNTER — MED REC SCAN ONLY (OUTPATIENT)
Dept: FAMILY MEDICINE CLINIC | Facility: CLINIC | Age: 37
End: 2022-04-27

## 2022-05-24 ENCOUNTER — TELEMEDICINE (OUTPATIENT)
Dept: FAMILY MEDICINE CLINIC | Facility: CLINIC | Age: 37
End: 2022-05-24

## 2022-05-24 VITALS — WEIGHT: 250 LBS | HEIGHT: 66 IN | BODY MASS INDEX: 40.18 KG/M2

## 2022-05-24 DIAGNOSIS — Z79.899 ENCOUNTER FOR LONG-TERM (CURRENT) USE OF MEDICATIONS: Primary | ICD-10-CM

## 2022-05-24 DIAGNOSIS — F98.8 ATTENTION DEFICIT DISORDER (ADD) WITHOUT HYPERACTIVITY: ICD-10-CM

## 2022-05-24 DIAGNOSIS — G47.26 SHIFT WORK SLEEP DISORDER: ICD-10-CM

## 2022-05-24 PROCEDURE — 99213 OFFICE O/P EST LOW 20 MIN: CPT | Performed by: FAMILY MEDICINE

## 2022-05-24 PROCEDURE — 3008F BODY MASS INDEX DOCD: CPT | Performed by: FAMILY MEDICINE

## 2022-05-25 NOTE — PROGRESS NOTES
Virtual/Telephone Check-In    Robinson Casillas  verbally consents to a Virtual/Telephone Check-In service on 5/24/2022 . Patient understands and accepts financial responsibility for any deductible, co-insurance and/or co-pays associated with this service. This was a video visit with audio and visual connection via Internet connection call with the patient    Duration of the service: 8 minutes      Summary of topics discussed:  See below      Problem List Items Addressed This Visit     None      Visit Diagnoses     Encounter for long-term (current) use of medications    -  Primary    Attention deficit disorder (ADD) without hyperactivity        refilll the adderall     Shift work sleep disorder               Patient presents with:  Medication Follow-Up      Medications allergies and chart reviewed    COVID-19 protocol      HPI:   Robinson Casillas is a 43year old female who schedules a virtual visit  Patient presents for issues with medication. She had been taking Adderall when she was taking training. 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 She has been quite busy at work but seems to perform for a while. Now that she started starting again she realizes she needs the Adderall to help with focus. She always tolerated this well. Since we have last seen her she has had some nasal turbinate surgery for her sinuses although this has not completely resolved her snoring issues she does feel that she is improved. She did have a full sleep study which revealed no significant number of apnea hypopnea incidence therefore did not necessarily need CPAP. She also sees her endocrinologist for the coverage of her hypothyroidism and Hashimoto's thyroiditis and thyroid nodule.         Allergies:    Doxycycline             HIVES    has a current medication list which includes the following prescription(s): amphetamine-dextroamphetamine, citalopram, temazepam, spironolactone, triamcinolone, vitamin d, clindamycin phos-benzoyl perox, tretinoin, nexplanon, levothyroxine, and loratadine. REVIEW OF SYSTEMS:   GENERAL: feels well otherwise  SKIN: no rashes  EYES:denies blurred vision or double vision  HEENT: no complaints of issues  LUNGS: denies shortness of breath with exertion  CARDIOVASCULAR: denies chest pain on exertion  GI: no nausea or abdominal pain  NEURO: denies headaches  MOOD see HPI       EXAM:   VITALS: not available as this is a telemed visit    GENERAL: No specific issues at this time    rest of physical exam unable to perform as this is a telemed visit  Patient alert and oriented and appropriate during examination did not appear to have any conversational dyspnea nor did she have any obvious discomfort or pain      ASSESSMENT AND PLAN:     1. Encounter for long-term (current) use of medications (Primary)  2. Attention deficit disorder (ADD) without hyperactivity  Comments:  refilll the adderall   3. Shift work sleep disorder        The patient indicates understanding of these issues and agrees to the plan. The patient is asked to return if sx's persist or worsen. Patient had an opportunity to ask questions and they were answered to their satisfaction. \"Please note that this visit was completed using two-way, real-time interactive audio and/or video communication. This has been done in good mitchel to provide continuity of care in the best interest of the provider-patient relationship, due to the ongoing public health crisis/national emergency and because of restrictions of visitation. There are limitations of this visit as no physical exam could be performed. Every conscious effort was taken to allow for sufficient and adequate time. This billing was spent on reviewing labs, medications, radiology tests and decision making. Appropriate medical decision-making and tests are ordered as detailed in the plan of care above. \"      Maribel White M.D., FAAFP

## 2022-06-13 DIAGNOSIS — R53.83 OTHER FATIGUE: ICD-10-CM

## 2022-06-13 RX ORDER — CITALOPRAM 40 MG/1
TABLET ORAL
Qty: 90 TABLET | Refills: 0 | Status: SHIPPED | OUTPATIENT
Start: 2022-06-13

## 2022-09-11 DIAGNOSIS — R53.83 OTHER FATIGUE: ICD-10-CM

## 2022-09-13 RX ORDER — CITALOPRAM 40 MG/1
TABLET ORAL
Qty: 90 TABLET | Refills: 0 | Status: SHIPPED | OUTPATIENT
Start: 2022-09-13

## 2022-10-05 ENCOUNTER — OFFICE VISIT (OUTPATIENT)
Dept: FAMILY MEDICINE CLINIC | Facility: CLINIC | Age: 37
End: 2022-10-05
Payer: COMMERCIAL

## 2022-10-05 VITALS
SYSTOLIC BLOOD PRESSURE: 118 MMHG | DIASTOLIC BLOOD PRESSURE: 80 MMHG | TEMPERATURE: 98 F | WEIGHT: 246.13 LBS | RESPIRATION RATE: 12 BRPM | HEART RATE: 93 BPM | BODY MASS INDEX: 40.03 KG/M2 | HEIGHT: 65.75 IN | OXYGEN SATURATION: 98 %

## 2022-10-05 DIAGNOSIS — E55.9 VITAMIN D DEFICIENCY: ICD-10-CM

## 2022-10-05 DIAGNOSIS — E66.01 SEVERE OBESITY (BMI >= 40) (HCC): ICD-10-CM

## 2022-10-05 DIAGNOSIS — E03.9 ACQUIRED HYPOTHYROIDISM: ICD-10-CM

## 2022-10-05 DIAGNOSIS — E04.1 RIGHT THYROID NODULE: ICD-10-CM

## 2022-10-05 DIAGNOSIS — Z00.00 WELL ADULT EXAM: Primary | ICD-10-CM

## 2022-10-05 DIAGNOSIS — F41.9 ANXIETY: ICD-10-CM

## 2022-10-05 DIAGNOSIS — E06.3 HASHIMOTO'S THYROIDITIS: ICD-10-CM

## 2022-10-05 PROCEDURE — 99395 PREV VISIT EST AGE 18-39: CPT | Performed by: FAMILY MEDICINE

## 2022-10-05 PROCEDURE — 3074F SYST BP LT 130 MM HG: CPT | Performed by: FAMILY MEDICINE

## 2022-10-05 PROCEDURE — 3008F BODY MASS INDEX DOCD: CPT | Performed by: FAMILY MEDICINE

## 2022-10-05 PROCEDURE — 3079F DIAST BP 80-89 MM HG: CPT | Performed by: FAMILY MEDICINE

## 2022-10-05 RX ORDER — BLOOD-GLUCOSE METER
1 EACH MISCELLANEOUS DAILY
COMMUNITY
Start: 2022-08-15

## 2022-10-05 RX ORDER — LANCETS 33 GAUGE
1 EACH MISCELLANEOUS DAILY
COMMUNITY
Start: 2022-09-17

## 2022-10-05 RX ORDER — BLOOD SUGAR DIAGNOSTIC
STRIP MISCELLANEOUS
COMMUNITY
Start: 2022-08-26

## 2022-10-06 ENCOUNTER — MED REC SCAN ONLY (OUTPATIENT)
Dept: FAMILY MEDICINE CLINIC | Facility: CLINIC | Age: 37
End: 2022-10-06

## 2022-12-10 DIAGNOSIS — R53.83 OTHER FATIGUE: ICD-10-CM

## 2022-12-12 RX ORDER — CITALOPRAM 40 MG/1
TABLET ORAL
Qty: 90 TABLET | Refills: 1 | Status: SHIPPED | OUTPATIENT
Start: 2022-12-12

## 2023-06-02 ENCOUNTER — TELEPHONE (OUTPATIENT)
Dept: FAMILY MEDICINE CLINIC | Facility: CLINIC | Age: 38
End: 2023-06-02

## 2023-06-02 NOTE — TELEPHONE ENCOUNTER
FYI-PT MADE VIDEO APPT FOR 6/7 FOR Difficulty taking deep breaths post covid, fatigue--IN CASE SHE NEEDS TO BE CALLED OR IF THIS NEEDS ADDRESSED SOONER

## 2023-06-03 NOTE — TELEPHONE ENCOUNTER
Message left for patient to call back, awaiting response. Left a message stating that if symptoms worsen she needs to go to ER. Also advised she should schedule an office visit not a video visit. Awaiting call back.

## 2023-06-05 NOTE — TELEPHONE ENCOUNTER
Patient calls back. Coming in Wednesday for an appointment. Doing ok, but having problems taking a deep breath. States has been like this for years. Occurs all the time. No distress. Has never used an inhaler. No wheezing. No fever.

## 2023-06-08 DIAGNOSIS — R53.83 OTHER FATIGUE: ICD-10-CM

## 2023-06-08 RX ORDER — CITALOPRAM 40 MG/1
TABLET ORAL
Qty: 90 TABLET | Refills: 1 | Status: SHIPPED | OUTPATIENT
Start: 2023-06-08

## 2023-06-09 ENCOUNTER — OFFICE VISIT (OUTPATIENT)
Dept: FAMILY MEDICINE CLINIC | Facility: CLINIC | Age: 38
End: 2023-06-09
Payer: COMMERCIAL

## 2023-06-09 ENCOUNTER — HOSPITAL ENCOUNTER (OUTPATIENT)
Dept: GENERAL RADIOLOGY | Age: 38
Discharge: HOME | End: 2023-06-09
Attending: FAMILY MEDICINE
Payer: COMMERCIAL

## 2023-06-09 VITALS
SYSTOLIC BLOOD PRESSURE: 122 MMHG | HEART RATE: 87 BPM | DIASTOLIC BLOOD PRESSURE: 80 MMHG | TEMPERATURE: 98 F | BODY MASS INDEX: 41 KG/M2 | WEIGHT: 251.31 LBS | OXYGEN SATURATION: 98 %

## 2023-06-09 DIAGNOSIS — R06.09 POST-COVID CHRONIC DYSPNEA: ICD-10-CM

## 2023-06-09 DIAGNOSIS — U09.9 POST-COVID CHRONIC DYSPNEA: ICD-10-CM

## 2023-06-09 DIAGNOSIS — R06.02 SHORTNESS OF BREATH: ICD-10-CM

## 2023-06-09 DIAGNOSIS — R53.83 FATIGUE, UNSPECIFIED TYPE: ICD-10-CM

## 2023-06-09 DIAGNOSIS — R06.02 SHORTNESS OF BREATH: Primary | ICD-10-CM

## 2023-06-09 PROCEDURE — 3074F SYST BP LT 130 MM HG: CPT | Performed by: FAMILY MEDICINE

## 2023-06-09 PROCEDURE — 71046 X-RAY EXAM CHEST 2 VIEWS: CPT | Performed by: FAMILY MEDICINE

## 2023-06-09 PROCEDURE — 3079F DIAST BP 80-89 MM HG: CPT | Performed by: FAMILY MEDICINE

## 2023-06-09 PROCEDURE — 99214 OFFICE O/P EST MOD 30 MIN: CPT | Performed by: FAMILY MEDICINE

## 2023-07-18 ENCOUNTER — MED REC SCAN ONLY (OUTPATIENT)
Dept: FAMILY MEDICINE CLINIC | Facility: CLINIC | Age: 38
End: 2023-07-18

## 2023-08-15 DIAGNOSIS — G47.26 SHIFT WORK SLEEP DISORDER: ICD-10-CM

## 2023-08-15 RX ORDER — TEMAZEPAM 15 MG/1
15 CAPSULE ORAL NIGHTLY PRN
Qty: 30 CAPSULE | Refills: 0 | Status: SHIPPED | OUTPATIENT
Start: 2023-08-15

## 2023-08-15 NOTE — TELEPHONE ENCOUNTER
Last refill:  06/09/20  qtY: 30  W/ 0 refills  Last ov: 06/09/23    Requested Prescriptions     Pending Prescriptions Disp Refills    temazepam 15 MG Oral Cap 30 capsule 0     Sig: Take 1 capsule (15 mg total) by mouth nightly as needed for Sleep. No future appointments.

## 2023-09-26 ENCOUNTER — MED REC SCAN ONLY (OUTPATIENT)
Dept: FAMILY MEDICINE CLINIC | Facility: CLINIC | Age: 38
End: 2023-09-26

## 2023-10-10 ENCOUNTER — OFFICE VISIT (OUTPATIENT)
Dept: FAMILY MEDICINE CLINIC | Facility: CLINIC | Age: 38
End: 2023-10-10
Payer: COMMERCIAL

## 2023-10-10 VITALS
HEIGHT: 65.75 IN | WEIGHT: 254.19 LBS | OXYGEN SATURATION: 97 % | DIASTOLIC BLOOD PRESSURE: 70 MMHG | SYSTOLIC BLOOD PRESSURE: 120 MMHG | BODY MASS INDEX: 41.34 KG/M2 | HEART RATE: 80 BPM | TEMPERATURE: 98 F

## 2023-10-10 DIAGNOSIS — J40 BRONCHITIS: Primary | ICD-10-CM

## 2023-10-10 DIAGNOSIS — J01.00 ACUTE NON-RECURRENT MAXILLARY SINUSITIS: ICD-10-CM

## 2023-10-10 PROCEDURE — 99213 OFFICE O/P EST LOW 20 MIN: CPT

## 2023-10-10 PROCEDURE — 3074F SYST BP LT 130 MM HG: CPT

## 2023-10-10 PROCEDURE — 3078F DIAST BP <80 MM HG: CPT

## 2023-10-10 PROCEDURE — 3008F BODY MASS INDEX DOCD: CPT

## 2023-10-10 RX ORDER — PROCHLORPERAZINE 25 MG/1
1 SUPPOSITORY RECTAL
COMMUNITY
Start: 2023-09-27

## 2023-10-10 RX ORDER — AMOXICILLIN AND CLAVULANATE POTASSIUM 875; 125 MG/1; MG/1
1 TABLET, FILM COATED ORAL 2 TIMES DAILY
Qty: 20 TABLET | Refills: 0 | Status: SHIPPED | OUTPATIENT
Start: 2023-10-10 | End: 2023-10-20

## 2023-10-10 RX ORDER — TIRZEPATIDE 2.5 MG/.5ML
2.5 INJECTION, SOLUTION SUBCUTANEOUS WEEKLY
COMMUNITY
Start: 2023-09-19

## 2023-10-10 RX ORDER — PROCHLORPERAZINE 25 MG/1
1 SUPPOSITORY RECTAL
COMMUNITY
Start: 2023-09-21

## 2023-10-10 RX ORDER — ALBUTEROL SULFATE 90 UG/1
2 AEROSOL, METERED RESPIRATORY (INHALATION) EVERY 6 HOURS PRN
Qty: 1 EACH | Refills: 0 | Status: SHIPPED | OUTPATIENT
Start: 2023-10-10

## 2023-10-10 RX ORDER — PREDNISONE 20 MG/1
TABLET ORAL
Qty: 9 TABLET | Refills: 0 | Status: SHIPPED | OUTPATIENT
Start: 2023-10-10 | End: 2023-10-16

## 2023-10-12 PROBLEM — G89.29 CHRONIC PAIN OF LEFT KNEE: Status: ACTIVE | Noted: 2021-11-05

## 2023-10-12 PROBLEM — M17.12 PRIMARY OSTEOARTHRITIS OF LEFT KNEE: Status: ACTIVE | Noted: 2021-11-07

## 2023-10-12 PROBLEM — M25.562 CHRONIC PAIN OF LEFT KNEE: Status: ACTIVE | Noted: 2021-11-05

## 2023-11-15 ENCOUNTER — MED REC SCAN ONLY (OUTPATIENT)
Dept: FAMILY MEDICINE CLINIC | Facility: CLINIC | Age: 38
End: 2023-11-15

## 2023-11-28 DIAGNOSIS — R53.83 OTHER FATIGUE: ICD-10-CM

## 2023-11-28 RX ORDER — CITALOPRAM 40 MG/1
40 TABLET ORAL DAILY
Qty: 90 TABLET | Refills: 1 | Status: SHIPPED | OUTPATIENT
Start: 2023-11-28

## 2023-11-28 NOTE — TELEPHONE ENCOUNTER
Last refill: 06/08/23  qtY: 90  W/ 1 refills  Last ov: 06/09/23    Requested Prescriptions     Pending Prescriptions Disp Refills    CITALOPRAM 40 MG Oral Tab [Pharmacy Med Name: CITALOPRAM TAB 40MG] 90 tablet 1     Sig: TAKE 1 TABLET DAILY     No orders of the defined types were placed in this encounter. No future appointments.

## 2023-12-11 ENCOUNTER — OFFICE VISIT (OUTPATIENT)
Dept: FAMILY MEDICINE CLINIC | Facility: CLINIC | Age: 38
End: 2023-12-11
Payer: COMMERCIAL

## 2023-12-11 VITALS
WEIGHT: 253.63 LBS | DIASTOLIC BLOOD PRESSURE: 70 MMHG | BODY MASS INDEX: 41.25 KG/M2 | HEART RATE: 88 BPM | OXYGEN SATURATION: 97 % | HEIGHT: 65.75 IN | TEMPERATURE: 98 F | SYSTOLIC BLOOD PRESSURE: 110 MMHG

## 2023-12-11 DIAGNOSIS — J01.00 ACUTE NON-RECURRENT MAXILLARY SINUSITIS: Primary | ICD-10-CM

## 2023-12-11 PROCEDURE — 3078F DIAST BP <80 MM HG: CPT

## 2023-12-11 PROCEDURE — 99213 OFFICE O/P EST LOW 20 MIN: CPT

## 2023-12-11 PROCEDURE — 3074F SYST BP LT 130 MM HG: CPT

## 2023-12-11 PROCEDURE — 3008F BODY MASS INDEX DOCD: CPT

## 2023-12-11 RX ORDER — AMOXICILLIN AND CLAVULANATE POTASSIUM 875; 125 MG/1; MG/1
1 TABLET, FILM COATED ORAL 2 TIMES DAILY
Qty: 20 TABLET | Refills: 0 | Status: SHIPPED | OUTPATIENT
Start: 2023-12-11 | End: 2023-12-21

## 2023-12-14 RX ORDER — METFORMIN HYDROCHLORIDE 500 MG/1
TABLET, EXTENDED RELEASE ORAL
COMMUNITY
Start: 2023-12-09

## 2024-02-07 ENCOUNTER — PATIENT MESSAGE (OUTPATIENT)
Dept: FAMILY MEDICINE CLINIC | Facility: CLINIC | Age: 39
End: 2024-02-07

## 2024-02-07 ENCOUNTER — TELEPHONE (OUTPATIENT)
Dept: FAMILY MEDICINE CLINIC | Facility: CLINIC | Age: 39
End: 2024-02-07

## 2024-02-07 DIAGNOSIS — F98.8 ATTENTION DEFICIT DISORDER (ADD) WITHOUT HYPERACTIVITY: ICD-10-CM

## 2024-02-08 RX ORDER — DEXTROAMPHETAMINE SACCHARATE, AMPHETAMINE ASPARTATE MONOHYDRATE, DEXTROAMPHETAMINE SULFATE AND AMPHETAMINE SULFATE 2.5; 2.5; 2.5; 2.5 MG/1; MG/1; MG/1; MG/1
10 CAPSULE, EXTENDED RELEASE ORAL EVERY MORNING
Qty: 30 CAPSULE | Refills: 0 | Status: SHIPPED | OUTPATIENT
Start: 2024-02-08

## 2024-02-08 NOTE — TELEPHONE ENCOUNTER
From: Madison Villegas  To: Warren Powell  Sent: 2/7/2024 6:42 PM CST  Subject: Adderall     I need this prescription that was sent to [facility]. To be sent to [facility] pharmacy, it can either be in [place] [facility]. Or through mail order it doesn't matter to me. Thank you

## 2024-02-08 NOTE — TELEPHONE ENCOUNTER
Contacted [facility] - informed that Adderall XR 10mg was approved.  Per pharmacy - medication cost is $120 because it is going towards her deductible  Message left on pt's identified cell # re: cost

## 2024-02-08 NOTE — TELEPHONE ENCOUNTER
Faxed request for prior authorization on d-amphetamine ER 10 mg    Prior authorization initiated through Epic, waiting for response

## 2024-05-20 DIAGNOSIS — F41.9 ANXIETY: ICD-10-CM

## 2024-05-21 NOTE — TELEPHONE ENCOUNTER
Last refill; 02/07/24  qtY: 90  W/ 1 refills  Last ov: 02/07/24    Requested Prescriptions     Pending Prescriptions Disp Refills    SERTRALINE 50 MG Oral Tab [Pharmacy Med Name: SERTRALINE TAB 50MG] 90 tablet 1     Sig: TAKE 1 TABLET DAILY     No future appointments.

## 2024-05-21 NOTE — TELEPHONE ENCOUNTER
I filled this yesterday  But in review it appears she stopped this and started citalopram    Which is she on or which does she prefer?

## 2024-05-21 NOTE — TELEPHONE ENCOUNTER
Call placed to patient and verified she is taking sertraline [facility]. Patient advised prescription was sent and to contact office if medication is not received. Verbalized understanding.

## 2024-07-03 ENCOUNTER — TELEMEDICINE (OUTPATIENT)
Dept: FAMILY MEDICINE CLINIC | Facility: CLINIC | Age: 39
End: 2024-07-03
Payer: COMMERCIAL

## 2024-07-03 DIAGNOSIS — M54.2 CERVICAL SPINE PAIN: Primary | ICD-10-CM

## 2024-07-03 PROCEDURE — 99213 OFFICE O/P EST LOW 20 MIN: CPT

## 2024-07-03 RX ORDER — METHYLPREDNISOLONE 4 MG/1
TABLET ORAL
Qty: 21 EACH | Refills: 0 | Status: SHIPPED | OUTPATIENT
Start: 2024-07-03

## 2024-07-03 RX ORDER — CYCLOBENZAPRINE HCL 10 MG
10 TABLET ORAL 3 TIMES DAILY PRN
Qty: 30 TABLET | Refills: 0 | Status: SHIPPED | OUTPATIENT
Start: 2024-07-03

## 2024-07-03 RX ORDER — CITALOPRAM 40 MG/1
TABLET ORAL
COMMUNITY
Start: 2024-04-09

## 2024-07-03 NOTE — PROGRESS NOTES
Virtual/Telephone Check-In    Madison Villegas  verbally consents to a Virtual/Telephone Check-In service on 7/3/2024 .  Patient understands and accepts financial responsibility for any deductible, co-insurance and/or co-pays associated with this service.      This was a video visit with audio and visual connection via Internet connection call with the patient    Duration of the service: 20 mins    Problem List Items Addressed This Visit    None  Visit Diagnoses       Cervical spine pain    -  Primary    Relevant Medications    methylPREDNISolone 4 MG Oral Tablet Therapy Pack    cyclobenzaprine 10 MG Oral Tab           Chief Complaint   Patient presents with    Neck Pain       Medications allergies and chart reviewed  COVID-19 protocol    HPI:   Madison Villegas is a 38 year old female who schedules a virtual visit with complaints of Neck pain that started yesterday.  She has been going to a chiropractor and feels this has helped some but this morning woke up with pain to her neck radiating down her right arm.  She went back to the chiropractor today and got some Relief with an adjustment and traction but pain came back once at home.       Allergies:  Allergies   Allergen Reactions    Doxycycline HIVES       Current Outpatient Medications   Medication Sig Dispense Refill    methylPREDNISolone 4 MG Oral Tablet Therapy Pack Take PO per pack instructions 21 each 0    cyclobenzaprine 10 MG Oral Tab Take 1 tablet (10 mg total) by mouth 3 (three) times daily as needed for Muscle spasms. 30 tablet 0    SERTRALINE 50 MG Oral Tab TAKE 1 TABLET DAILY 90 tablet 1    amphetamine-dextroamphetamine ER (ADDERALL XR) 10 MG Oral Capsule SR 24 Hr Take 1 capsule (10 mg total) by mouth every morning. 30 capsule 0    metFORMIN  MG Oral Tablet 24 Hr       Continuous Blood Gluc Sensor (DEXCOM G6 SENSOR) Does not apply Misc 1 Device Every 10 days. CHANGE SENSOR EVERY 10 DAYS      Continuous Blood Gluc Transmit (DEXCOM G6  TRANSMITTER) Does not apply Misc 1 Device every 3 (three) months.      MOUNJARO 2.5 MG/0.5ML Subcutaneous Solution Pen-injector 2.5 mg by Implant route once a week.      albuterol 108 (90 Base) MCG/ACT Inhalation Aero Soln Inhale 2 puffs into the lungs every 6 (six) hours as needed. 1 each 0    temazepam 15 MG Oral Cap Take 1 capsule (15 mg total) by mouth nightly as needed for Sleep. 30 capsule 0    Blood Glucose Monitoring Suppl (ONETOUCH VERIO FLEX SYSTEM) w/Device Does not apply Kit Apply 1 Application topically daily.      Glucose Blood (ONETOUCH VERIO) In Vitro Strip USE 2 STRIPS TWICE DAILY      Lancets (ONETOUCH DELICA PLUS [id number]) Does not apply Misc 1 strip by In Vitro route daily.      spironolactone 100 MG Oral Tab       Cholecalciferol (VITAMIN D OR) Take 5,000 Units by mouth 3 (three) times daily.      loratadine (CLARITIN) 10 MG Oral Tab Take 1 tablet (10 mg total) by mouth daily.      NEXPLANON 68 MG Subcutaneous Implant       levothyroxine (SYNTHROID) 50 MCG Oral Tab Take 1 tablet (50 mcg total) by mouth daily. 30 tablet 0      Past Medical History:    Anxiety      Past Surgical History:   Procedure Laterality Date    Other surgical history Left 9/2007    knee arthroscopy meniscus      Family History   Problem Relation Age of Onset    Other (Other) Mother         anemia      Social History     Socioeconomic History    Marital status:    Tobacco Use    Smoking status: Never    Smokeless tobacco: Never   Vaping Use    Vaping status: Never Used   Substance and Sexual Activity    Alcohol use: No    Drug use: No     Social Determinants of Health      [facility]    Social Connections    [facility]    Housing Stability         REVIEW OF SYSTEMS:   GENERAL: denies fever, chills, body aches, unusual weight gain  SKIN: no rashes  EYES:denies blurred vision or double  vision  HEENT: denies nasal congestion, PND, earache, sore throat, loss of smell or taste  LUNGS: denies cough, SOB, chest heaviness, wheezing  CARDIOVASCULAR: denies chest pain, palpitations, unusual swelling in extremities  GI: denies abdominal pain, nausea, vomiting, change in bowel movements  : denies dysuria, frequency, hematuria  NEURO: denies headaches, dizziness, LOC    EXAM:   VITALS: not available as this is a telemed visit    GENERAL: Does not appear to be in acute distress  LUNG: No dyspnea with conversation    rest of physical exam unable to perform as this is a telemed visit    ASSESSMENT AND PLAN:     Encounter Diagnosis   Name Primary?    Cervical spine pain Yes     Problem List Items Addressed This Visit    None  Visit Diagnoses       Cervical spine pain    -  Primary    Relevant Medications    methylPREDNISolone 4 MG Oral Tablet Therapy Pack    cyclobenzaprine 10 MG Oral Tab            Steroid dose pack sent to pharmacy as well as muscle relaxer.  Instructions provided for medication use.  Recommended patient follow up on mychart in 4 days regarding symptoms.  If symptoms are not resolving recommended cervical spine xray.  The patient indicates understanding of these issues and agrees to the plan.  The patient is asked to return if sx's persist or worsen.    Patient had an opportunity to ask questions and they were answered to her satisfaction.    \"Please note that this visit was completed using two-way, real-time interactive audio and/or video communication.  This has been done in good mitchel to provide continuity of care in the best interest of the provider-patient relationship.  There are limitations of this visit as no physical exam could be performed.  Every conscious effort was taken to allow for sufficient and adequate time.  This billing was spent on reviewing labs, medications, radiology tests and decision making.  Appropriate medical decision-making and tests are ordered as detailed in  the plan of care above.\"      nAa Mcmahon, APRN

## 2024-07-09 ENCOUNTER — ORDER TRANSCRIPTION (OUTPATIENT)
Dept: PHYSICAL THERAPY | Facility: HOSPITAL | Age: 39
End: 2024-07-09

## 2024-07-09 DIAGNOSIS — M54.2 CERVICALGIA: Primary | ICD-10-CM

## 2024-07-11 ENCOUNTER — TELEPHONE (OUTPATIENT)
Dept: PHYSICAL THERAPY | Facility: HOSPITAL | Age: 39
End: 2024-07-11

## 2024-07-23 ENCOUNTER — OFFICE VISIT (OUTPATIENT)
Dept: FAMILY MEDICINE CLINIC | Facility: CLINIC | Age: 39
End: 2024-07-23
Payer: COMMERCIAL

## 2024-07-23 ENCOUNTER — HOSPITAL ENCOUNTER (OUTPATIENT)
Dept: GENERAL RADIOLOGY | Age: 39
Discharge: HOME | End: 2024-07-23
Payer: COMMERCIAL

## 2024-07-23 VITALS
HEART RATE: 88 BPM | DIASTOLIC BLOOD PRESSURE: 86 MMHG | BODY MASS INDEX: 39.2 KG/M2 | WEIGHT: 241 LBS | OXYGEN SATURATION: 98 % | SYSTOLIC BLOOD PRESSURE: 121 MMHG | RESPIRATION RATE: 26 BRPM | HEIGHT: 65.75 IN | TEMPERATURE: 98 F

## 2024-07-23 DIAGNOSIS — M54.2 NECK PAIN: ICD-10-CM

## 2024-07-23 DIAGNOSIS — M54.2 NECK PAIN: Primary | ICD-10-CM

## 2024-07-23 PROCEDURE — 72040 X-RAY EXAM NECK SPINE 2-3 VW: CPT

## 2024-07-23 PROCEDURE — 99214 OFFICE O/P EST MOD 30 MIN: CPT

## 2024-07-23 RX ORDER — DICLOFENAC SODIUM 75 MG/1
75 TABLET, DELAYED RELEASE ORAL 2 TIMES DAILY
Qty: 28 TABLET | Refills: 0 | Status: SHIPPED | OUTPATIENT
Start: 2024-07-23 | End: 2024-08-06

## 2024-07-23 NOTE — PROGRESS NOTES
Madison Villegas is a 38 year old female.  HPI:     Patient in office for right sided neck pain.  Pain started 20 days ago.  She thought she had slept funny and that caused her pain.  She was seen over telehealth by myself 20 days ago for pain and prescribed a medrol dose pack and flexeril. She reports the Steroid did not help but the muscle relaxer did.  She has been seeing her chiropractor and he recommended she the pain specialist Dr. Lara in [place].  He prescribed her gabapentin but she reports it did not help and make her too sleepy and she discontinued this medication.  She started physical therapy with [facility] in [place] and feel it has been helpful (Reports pain went from a 8/10 to a 3/10) but reports she goes 3 times a week and between that and doing the exercises at home she feels like this is a full time job.  She reports still having pain going down her right arm. She will be having injections at the pain clinic in the middle of August.  She has not been to work due to the pain and is requesting to be on short term disability due to this.       Current Outpatient Medications   Medication Sig Dispense Refill    methylPREDNISolone 4 MG Oral Tablet Therapy Pack Take PO per pack instructions 21 each 0    cyclobenzaprine 10 MG Oral Tab Take 1 tablet (10 mg total) by mouth 3 (three) times daily as needed for Muscle spasms. 30 tablet 0    citalopram 40 MG Oral Tab       SERTRALINE 50 MG Oral Tab TAKE 1 TABLET DAILY 90 tablet 1    amphetamine-dextroamphetamine ER (ADDERALL XR) 10 MG Oral Capsule SR 24 Hr Take 1 capsule (10 mg total) by mouth every morning. 30 capsule 0    metFORMIN  MG Oral Tablet 24 Hr       Continuous Blood Gluc Sensor (DEXCOM G6 SENSOR) Does not apply Misc 1 Device Every 10 days. CHANGE SENSOR EVERY 10 DAYS      Continuous Blood Gluc Transmit (DEXCOM G6 TRANSMITTER) Does not apply Misc 1 Device every 3 (three) months.      MOUNJARO 2.5 MG/0.5ML Subcutaneous Solution  Pen-injector 2.5 mg by Implant route once a week.      albuterol 108 (90 Base) MCG/ACT Inhalation Aero Soln Inhale 2 puffs into the lungs every 6 (six) hours as needed. 1 each 0    temazepam 15 MG Oral Cap Take 1 capsule (15 mg total) by mouth nightly as needed for Sleep. 30 capsule 0    Blood Glucose Monitoring Suppl (ONETOUCH VERIO FLEX SYSTEM) w/Device Does not apply Kit Apply 1 Application topically daily.      Glucose Blood (ONETOUCH VERIO) In Vitro Strip USE 2 STRIPS TWICE DAILY      Lancets (ONETOUCH DELICA PLUS [id number]) Does not apply Misc 1 strip by In Vitro route daily.      spironolactone 100 MG Oral Tab       Cholecalciferol (VITAMIN D OR) Take 5,000 Units by mouth 3 (three) times daily.      loratadine (CLARITIN) 10 MG Oral Tab Take 1 tablet (10 mg total) by mouth daily.      NEXPLANON 68 MG Subcutaneous Implant       levothyroxine (SYNTHROID) 50 MCG Oral Tab Take 1 tablet (50 mcg total) by mouth daily. 30 tablet 0      Past Medical History:    Anxiety      Social History:  Social History     Socioeconomic History    Marital status:    Tobacco Use    Smoking status: Never    Smokeless tobacco: Never   Vaping Use    Vaping status: Never Used   Substance and Sexual Activity    Alcohol use: No    Drug use: No     Social Determinants of Health      [facility]    Social Connections    Received from [facility], [facility]    Housing Stability          REVIEW OF SYSTEMS:     Review of Systems   Constitutional:  Negative for activity change, appetite change and fatigue.   HENT:  Negative for congestion, hearing loss and sore throat.    Eyes:  Negative for discharge and redness.   Respiratory:  Negative for shortness of breath and wheezing.    Cardiovascular:  Negative for chest pain.   Gastrointestinal:  Negative for abdominal distention and abdominal pain.   Endocrine: Negative for cold  intolerance and heat intolerance.   Genitourinary:  Negative for difficulty urinating and urgency.   Musculoskeletal:  Positive for arthralgias and neck pain.   Skin:  Negative for color change.   Allergic/Immunologic: Negative for environmental allergies.   Neurological:  Negative for dizziness, syncope and headaches.   Hematological:  Negative for adenopathy. Does not bruise/bleed easily.   Psychiatric/Behavioral:  Negative for agitation, behavioral problems and confusion.        EXAM:   /86   Pulse 88   Temp 98.1 °F (36.7 °C) (Temporal)   Resp 26   Ht 5' 5.75\" (1.67 m)   Wt 241 lb (109.3 kg)   SpO2 98%   BMI 39.19 kg/m²     Physical Exam  Constitutional:       Appearance: Normal appearance. She is normal weight.   HENT:      Head: Normocephalic and atraumatic.   Cardiovascular:      Rate and Rhythm: Normal rate and regular rhythm.      Pulses: Normal pulses.      Heart sounds: Normal heart sounds.   Pulmonary:      Effort: Pulmonary effort is normal.      Breath sounds: Normal breath sounds.   Musculoskeletal:      Cervical back: Muscular tenderness present. Decreased range of motion.   Skin:     General: Skin is warm and dry.      Capillary Refill: Capillary refill takes less than 2 seconds.   Neurological:      Mental Status: She is alert and oriented to person, place, and time.   Psychiatric:         Mood and Affect: Mood normal.         Behavior: Behavior normal.          ASSESSMENT AND PLAN:     1. Neck pain  Xray of cervical spine shows reversal of normal cervical lordosis and arthritis.  Diclofenac sent to pharmacy and instructions provided.  - XR CERVICAL SPINE (2-3 VIEWS) (CPT=72040); Future  - diclofenac 75 MG Oral Tab EC; Take 1 tablet (75 mg total) by mouth 2 (two) times daily for 14 days.  Dispense: 28 tablet; Refill: 0    The patient indicates understanding of these issues and agrees to the plan.    Ana Mcmahon, APRN  7/23/2024

## 2024-07-24 ENCOUNTER — TELEPHONE (OUTPATIENT)
Dept: FAMILY MEDICINE CLINIC | Facility: CLINIC | Age: 39
End: 2024-07-24

## 2024-07-24 NOTE — TELEPHONE ENCOUNTER
Request from GUERO aMy  for patient to come in and sign release of information forms for;  [facility] in [place] for physical therapy, Dr. Lara in [place] (pain doctor) and a chiropractor.     Patient notified of above and agrees to come in with information and to sign releases.

## 2024-07-30 ENCOUNTER — APPOINTMENT (OUTPATIENT)
Dept: PHYSICAL THERAPY | Age: 39
End: 2024-07-30
Payer: COMMERCIAL

## 2024-08-02 ENCOUNTER — APPOINTMENT (OUTPATIENT)
Dept: PHYSICAL THERAPY | Age: 39
End: 2024-08-02
Payer: COMMERCIAL

## 2024-08-12 ENCOUNTER — APPOINTMENT (OUTPATIENT)
Dept: PHYSICAL THERAPY | Age: 39
End: 2024-08-12
Payer: COMMERCIAL

## 2024-08-15 ENCOUNTER — APPOINTMENT (OUTPATIENT)
Dept: PHYSICAL THERAPY | Age: 39
End: 2024-08-15
Payer: COMMERCIAL

## 2024-08-19 ENCOUNTER — APPOINTMENT (OUTPATIENT)
Dept: PHYSICAL THERAPY | Age: 39
End: 2024-08-19
Payer: COMMERCIAL

## 2024-08-22 ENCOUNTER — APPOINTMENT (OUTPATIENT)
Dept: PHYSICAL THERAPY | Age: 39
End: 2024-08-22
Payer: COMMERCIAL

## 2024-08-26 ENCOUNTER — APPOINTMENT (OUTPATIENT)
Dept: PHYSICAL THERAPY | Age: 39
End: 2024-08-26
Payer: COMMERCIAL

## 2024-10-01 ENCOUNTER — TELEPHONE (OUTPATIENT)
Dept: FAMILY MEDICINE CLINIC | Facility: CLINIC | Age: 39
End: 2024-10-01

## 2024-10-01 ENCOUNTER — MED REC SCAN ONLY (OUTPATIENT)
Dept: FAMILY MEDICINE CLINIC | Facility: CLINIC | Age: 39
End: 2024-10-01

## 2024-10-01 NOTE — TELEPHONE ENCOUNTER
Faxed received from [facility] with Mammogram results exam date of 10-1-24.    Dr Powell carefully reviewed and noted:  Annual screening mammography is recommended, bilateral    Care gap updated    Copy to scan.    Patient notified of results and verbalized understanding.

## 2024-10-21 ENCOUNTER — TELEPHONE (OUTPATIENT)
Dept: SURGERY | Facility: CLINIC | Age: 39
End: 2024-10-21

## 2024-10-21 ENCOUNTER — OFFICE VISIT (OUTPATIENT)
Dept: SURGERY | Facility: CLINIC | Age: 39
End: 2024-10-21
Payer: COMMERCIAL

## 2024-10-21 VITALS
HEIGHT: 66 IN | HEART RATE: 72 BPM | BODY MASS INDEX: 38.57 KG/M2 | DIASTOLIC BLOOD PRESSURE: 84 MMHG | SYSTOLIC BLOOD PRESSURE: 118 MMHG | WEIGHT: 240 LBS

## 2024-10-21 DIAGNOSIS — M54.12 CERVICAL RADICULOPATHY: Primary | ICD-10-CM

## 2024-10-21 PROCEDURE — 99203 OFFICE O/P NEW LOW 30 MIN: CPT | Performed by: NEUROLOGICAL SURGERY

## 2024-10-21 NOTE — PROGRESS NOTES
Formerly [facility], later [facility]  Neurological Surgery Clinic Note    Madison Villegas  8/21/1985  [id number]  PCP: Warren Powell MD    REASON FOR VISIT:  RUE radiculopathy    HISTORY OF PRESENT ILLNESS:  Madison Villegas is a(n) 39 year old female RUE radiculopathy. Patient reports symptoms began a few months ago. She has undergone PT and OTC meds with no releif.  She has had an PHAM with some improvement and is scheduled for a median n. Branch block.  She has no weakness. Symptoms radiate in the C6 nerve distribution.  She has no wekaness. She has no gait instability.  She denies any trauma nor falls.       Location: neck pain  Quality: RUE radiculopathy  Severity: progressive  Duration: months  Timing: any  Context: HNP with stenosis at C5-6, C6-7, reversal of normal lordotic curve  Modifying Factors: PT and meds offer no long term relief   Associated signs/symptoms: pain      PAST MEDICAL HISTORY:  Past Medical History:    Anxiety       PAST SURGICAL HISTORY:  Past Surgical History:   Procedure Laterality Date    Other surgical history Left 9/2007    knee arthroscopy meniscus       FAMILY HISTORY:  family history includes Other in her mother.    SOCIAL HISTORY:   reports that she has never smoked. She has never used smokeless tobacco. She reports that she does not drink alcohol and does not use drugs.    ALLERGIES:  Allergies[1]    MEDICATIONS:  Medications Ordered Prior to Encounter[2]    REVIEW OF SYSTEMS:  Review of Systems   All other systems reviewed and are negative.       PHYSICAL EXAMINATION:  Neurological Exam  Mental Status  Awake, alert and oriented to person, place and time.    Cranial Nerves  CN I: Sense of smell is normal.  CN II: Visual acuity is normal. Visual fields full to confrontation.  CN III, IV, VI: Extraocular movements intact bilaterally. Normal lids and orbits bilaterally. Pupils equal round and reactive to light bilaterally.  CN V: Facial sensation is normal.  CN VII: Full and symmetric  facial movement.  CN VIII: Hearing is normal.  CN IX, X: Palate elevates symmetrically. Normal gag reflex.  CN XI: Shoulder shrug strength is normal.  CN XII: Tongue midline without atrophy or fasciculations.    Motor  Normal muscle bulk throughout. No fasciculations present. Normal muscle tone. No abnormal involuntary movements. Strength is 5/5 throughout all four extremities.    Sensory  Sensation is intact to light touch, pinprick, vibration and proprioception in all four extremities.    Reflexes  Deep tendon reflexes are 2+ and symmetric in all four extremities.    Coordination    Finger-to-nose, rapid alternating movements and heel-to-shin normal bilaterally without dysmetria.    Gait  Normal casual, toe, heel and tandem gait.       IMAGING:  As above    ASSESSMENT:  RUE radiculopathy    Plan:  Recommend C5-7 ADR      The plan of care was discussed with the patient at length. All questions and concerns were addressed at this time. The patient verbalized agreement with the plan and was appreciative.     Total visit time: 30 minutes; More than 50% spent coordinating care, counseling, reviewing imaging and discussing medication therapy.     Mary Cid,   Neurological Surgery  Count includes the [facility]         [1]   Allergies  Allergen Reactions    Doxycycline HIVES   [2]   Current Outpatient Medications on File Prior to Visit   Medication Sig Dispense Refill    melatonin 5 MG Oral Cap Take 1 capsule (5 mg total) by mouth nightly.      SERTRALINE 50 MG Oral Tab TAKE 1 TABLET DAILY 90 tablet 1    amphetamine-dextroamphetamine ER (ADDERALL XR) 10 MG Oral Capsule SR 24 Hr Take 1 capsule (10 mg total) by mouth every morning. 30 capsule 0    metFORMIN  MG Oral Tablet 24 Hr       Continuous Blood Gluc Sensor (DEXCOM G6 SENSOR) Does not apply Misc 1 Device Every 10 days. CHANGE SENSOR EVERY 10 DAYS      Continuous Blood Gluc Transmit (DEXCOM G6 TRANSMITTER) Does not apply Misc 1 Device every 3 (three) months.       temazepam 15 MG Oral Cap Take 1 capsule (15 mg total) by mouth nightly as needed for Sleep. 30 capsule 0    Blood Glucose Monitoring Suppl (ONETOUCH VERIO FLEX SYSTEM) w/Device Does not apply Kit Apply 1 Application topically daily.      Glucose Blood (ONETOUCH VERIO) In Vitro Strip USE 2 STRIPS TWICE DAILY      Lancets (ONETOUCH DELICA PLUS [id number]) Does not apply Misc 1 strip by In Vitro route daily.      spironolactone 100 MG Oral Tab       Cholecalciferol (VITAMIN D OR) Take 5,000 Units by mouth 3 (three) times daily.      loratadine (CLARITIN) 10 MG Oral Tab Take 1 tablet (10 mg total) by mouth daily.      NEXPLANON 68 MG Subcutaneous Implant       levothyroxine (SYNTHROID) 50 MCG Oral Tab Take 1 tablet (50 mcg total) by mouth daily. 30 tablet 0    cyclobenzaprine 10 MG Oral Tab Take 1 tablet (10 mg total) by mouth 3 (three) times daily as needed for Muscle spasms. (Patient not taking: Reported on 10/21/2024) 30 tablet 0    MOUNJARO 2.5 MG/0.5ML Subcutaneous Solution Pen-injector 2.5 mg by Implant route once a week. (Patient not taking: Reported on 10/21/2024)      albuterol 108 (90 Base) MCG/ACT Inhalation Aero Soln Inhale 2 puffs into the lungs every 6 (six) hours as needed. (Patient not taking: Reported on 10/21/2024) 1 each 0     No current facility-administered medications on file prior to visit.

## 2024-10-21 NOTE — TELEPHONE ENCOUNTER
Received by fax Attending physicians statement from [employer] (Claim #[id number]).  Forwarded to forms department copy of form scanned and forwarded to forms department thru outlook, original sent thru intercompany mail.  No fee collected.

## 2024-10-21 NOTE — PATIENT INSTRUCTIONS
Refill policies:    Allow 2-3 business days for refills; controlled substances may take longer.  Contact your pharmacy at least 5 days prior to running out of medication and have them send an electronic request or submit request through the “request refill” option in your [employer] account.  Refills are not addressed on weekends; covering physicians do not authorize routine medications on weekends.  No narcotics or controlled substances are refilled after noon on Fridays or by on call physicians.  By law, narcotics must be electronically prescribed.  A 30 day supply with no refills is the maximum allowed.  If your prescription is due for a refill, you may be due for a follow up appointment.  To best provide you care, patients receiving routine medications need to be seen at least once a year.  Patients receiving narcotic/controlled substance medications need to be seen at least once every 3 months.  In the event that your preferred pharmacy does not have the requested medication in stock (e.g. Backordered), it is your responsibility to find another pharmacy that has the requested medication available.  We will gladly send a new prescription to that pharmacy at your request.    Scheduling Tests:    If your physician has ordered radiology tests such as MRI or CT scans, please contact Central Scheduling at [phone] right away to schedule the test.  Once scheduled, the [facility] Centralized Referral Team will work with your insurance carrier to obtain pre-certification or prior authorization.  Depending on your insurance carrier, approval may take 3-10 days.  It is highly recommended patients assure they have received an authorization before having a test performed.  If test is done without insurance authorization, patient may be responsible for the entire amount billed.      Precertification and Prior Authorizations:  If your physician has recommended that you have a procedure or additional testing performed the [facility]  Centralized Referral Team will contact your insurance carrier to obtain pre-certification or prior authorization.    You are strongly encouraged to contact your insurance carrier to verify that your procedure/test has been approved and is a COVERED benefit.  Although the [facility] Centralized Referral Team does its due diligence, the insurance carrier gives the disclaimer that \"Although the procedure is authorized, this does not guarantee payment.\"    Ultimately the patient is responsible for payment.   Thank you for your understanding in this matter.  Paperwork Completion:  If you require FMLA or disability paperwork for your recovery, please make sure to either drop it off or have it faxed to our office at [phone]. Be sure the form has your name and date of birth on it.  The form will be faxed to our Forms Department and they will complete it for you.  There is a 25$ fee for all forms that need to be filled out.  Please be aware there is a 10-14 day turnaround time.  You will need to sign a release of information (SRINIVAS) form if your paperwork does not come with one.  You may call the Forms Department with any questions at [phone].  Their fax number is [phone].     Surgery INSTRUCTIONS      You are scheduled for C5-6, C6-7 ADR on 11/12/2024 with Dr. Gerard Cid at [facility].    Pre-op instructions are as follows below:    You will need to contact the Pre-admission department at [phone]. You will speak with a nurse who will review your health history and give you information from the hospital. The nurse will also get you scheduled for all your pre-op testing required for your surgery. If they do not answer when you call please leave a message and they will call you back, they return calls in surgical order, it may be a few days before they return your call.    No blood thinning medications, over the counter non-steroidal antiinflammatories, herbal supplements (garlic,tumeric etc.), vitamin E, fish  oil or krill oil for at least 7-14 days prior to surgery.     You may only take Tylenol, Extra Strength Tylenol, Arthritis Tylenol, or prescription Norco or Tramadol for pain if something is needed.    You will need to prep your skin using Hibiclens. Detailed instructions are provided below. Your showers are to start 5 days prior to your scheduled surgery. The last shower should be the night before surgery.     You should have nothing to eat or drink after 11:00pm the night prior to surgery except for the following:    Do drink 12 ounces of regular Gatorade (NOT RED) 12 hours and 4 hours prior to your scheduled surgery time, Do not drink any other liquids (including water) before your surgery. Do not chew gum or eat candies before surgery.  Take 1000 mg of Tylenol (Acetaminophen) 4 hours before your scheduled surgery time, take this with your scheduled Gatorade.    Our office will get authorization for surgery. We will attempt to contact you 3 days before surgery if we run into any complications or have not received your surgery authorization. We will continue to attempt to get authorization until 2pm the day before surgery. If authorization is not received we will give you a call to discuss next steps. Our goal is to make sure you do not proceed with an un-authorized surgery so that you do not end up having to pay for this surgery out of pocket.    Surgery is usually scheduled as 23 hour admission.  This is an estimate and varies from person to person.  Ultimately, the surgeon will determine when you are ready to be discharged.    PCP clearance is needed.  Our office will send a letter to your PCP, Dr. Warren Powell. Please contact their office for appointment. Please make sure this appointment is completed at least 1 week prior to surgical date    The hospital will contact you 1-2 days before surgery with your arrival time.    Post operative you can move your neck as usual, refrain from excessive range of motion,  you cannot drive for the first week post operative. No heavy lifting for one week.   If you were on blood thinners (such as Coumadin, Pradaxa, Xarelto, etc) prior to surgery that we had you stop for surgery, please make sure you get instructions about when to resume the medication before you are discharged from the hospital    Per Dr. Cid's surgery protocol your appointments are as follows:     2 week pre-op surgical telehealth/phone appointment is on 10/29/24 at 9:00 am with Shantel Garcia RN     1 week pre-op surgical review scheduled on 11/4/2024 at 10:40 am with Dr. Cid.     1 week post-op appointment scheduled on 11/18/2024 at 9:00 am with GUERO Hernandez     4 week post-op appointment scheduled on 12/9/2024 at 11:00 am with Dr. Cid     3 month post surgery appointment needs to be scheduled while at office for your 4 week follow up visit.       Please make sure to arrive at least 15 minutes prior to your scheduled appointment in order to get checked in and roomed in a timely manner.  Depending on provider availability, late patients may be required to reschedule.  REFILLS:  After surgery, please remember that we do have a 48 hour refill policy that does not include weekends, please make sure to request your medications in a timely manner so that you do not go days without medication.    *Refills should be requested through your pharmacy or through the refill request in [email or web address] (log in, go to medications, then select refill request).    [employer] MESSAGING:  Please remember that our office is closed during the weekend and no one is available for [email or web address] messages. If you have an urgent or emergent matter please go to a walk-in center or the emergency room. Also please remember your [employer] messages are part of your legal medical record and should not be utilized as a personal email with our providers as it is visible to all [facility] employees. Also, Since [employer] messages are not for emergent matters  it may be several days before there is a response to your message.    FMLA/PAPERWORK COMPLETED BY OUR MEDICAL FORMS DEPARTMENT:  If you require FMLA paperwork for your surgery, please make sure to either Drop it off or have it faxed to our office at [phone]. Make sure it has your NAME, , and has your signature. You will need to have a Release of Information on file. To facilitate this process we ask that you requested it at the  on your way out and sign it. Without a signed SRINIVAS or signature on the form we will not be able to fax it and this will cause a delay with your forms. Fees charged for forms are $25 for initial submission and $15 for recertifications. If you have questions on the status of your forms please call the forms department at [phone].  **We do have a 3 week policy for all forms and paperwork, please make sure to allow plenty of time for completion. Same day paperwork will not be completed. **      Spine Navigator  You will have a 30 minute telehealth/phone visit with our spine navigator approximately 2 weeks before your surgery. This visit is NOT optional. This appointment will get booked when you schedule your spinal surgery.  When speaking with Pre-admissions you will be told about a spine class as it relates to your surgery, this is an OPTIONAL class. The same or similar information will be discussed with you during your telehealth appointment with the spine navigator (Spine Navigator appointment is not optional). However, if you would like to have this information repeated to you or if you would feel more comfortable with additional information, you can elect to schedule this class during your pre-admissions phone call.  The Pre-op Spine Surgery Class is held at [facility] most  from 3:30-4:30 pm. Call Pre-admission Testing (PAT) to register at:  [phone]. Please [facility] the [facility], check in at registration and [facility]  your escort to class on the Ortho Spine unit. If unable to attend, but would like additional information, the class is available online at [email or web address].     Please visit the following website for the detailed and up-to-date visitor screening and restriction policy at [email or web address].    Hibiclens Bathing  Hibiclens is a body soap that is used before surgery protect you from getting an infection after surgery   Hibiclens comes in a large blue bottle and can be found in most pharmacies in the First Aid supplies  Shower with this daily for FIVE consecutive days before surgery, using the entire bottle over the five days.  The last shower should be the night before surgery.   Steps to bathing with Hibiclens  Do not use Hibiclens on your hair, face or private areas  Wash your hair and face as normal with your usual cleansers  Rinse well  Using a clean wet washcloth apply enough Hibiclens to cover your body. Wash from the neck down avoiding the genital areas and concentrating on the surgical area  Rinse well  Dry yourself with a clean, dry towel  Do not use any powders, creams, lotions or sprays on your body as these attract bacteria  Deodorant and facial creams are acceptable.   (Laundering/cleaning: Chlorhexidine gluconate skin cleansers will cause stains if used with chlorine releasing products. Rinse completely and use only non-chlorine detergents.)  Thank you,   MARTHA Staff

## 2024-10-21 NOTE — TELEPHONE ENCOUNTER
Received by patient, an  imaging disc from [facility]. Also received, radiology reports.    Disc contained : MRI Cervical Spine  DOS 8/29/24          Endorsed to nurse's bin for provider review radiology reports.    Imaging disc at  drawer for pickup at appointment.    Uploaded into PACS successfully, PSR confirmed in [employer] imaging is viewable.

## 2024-10-21 NOTE — PROGRESS NOTES
New patient:  Reason for visit:  R sided neck pain radiating to R arm, R arm weakness, R hand tingling   Symptoms initially started in July     Numeric Rating Scale:        Pain at Present: 3/10                                                                                                             Past Treatments for Current Pain Condition:   Completed MDP, pt discontinued gabapentin/flexeril due to making her feel sleepy   Completed PT  9/18 LUIGI by Dr Lara (pain mgmt) at [facility]     Prior diagnostic testing related to this condition:    MRI cervical spine DOS 08/29/24 uploaded to pacs  XR cervical spine DOS 07/23/24

## 2024-10-22 ENCOUNTER — PATIENT MESSAGE (OUTPATIENT)
Dept: SURGERY | Facility: CLINIC | Age: 39
End: 2024-10-22

## 2024-10-22 ENCOUNTER — TELEPHONE (OUTPATIENT)
Dept: FAMILY MEDICINE CLINIC | Facility: CLINIC | Age: 39
End: 2024-10-22

## 2024-10-22 NOTE — TELEPHONE ENCOUNTER
I don't normally back date because she didn't see me until now.      It would look better that she have her PCP or whomever was managing this prior to us date from 7/19 til our first evaluation.    I am ok with her leave til 1/7/25    She may have 3-5 flareup a month lasting a day or so    She has appts 1 week, 1 month, 3 months, 6 months, and 1 year after surgery-not monthly and they last 15-20 min    MKK

## 2024-10-22 NOTE — TELEPHONE ENCOUNTER
Pre op paper work received today and reviewed by Dr Powell.  
She made an appointment on her my chart for 10/29 for a pre op.  Do you know about this and have everything you need?  
115

## 2024-10-22 NOTE — TELEPHONE ENCOUNTER
Carlos Cid,    Patient is requesting disability for Radiculopathy/ R neck pain. Patient would like for leave to be back dated to 07/19/24 with a projected return to work date of 01/07/25 pending re-eval.    Do you Support?    Thank you   Arturo

## 2024-10-22 NOTE — TELEPHONE ENCOUNTER
Patient called to check if disability forms has been received. Located forms and logged for processing and [employer] message sent to patient for authorization.     Type of Leave: Disability (continuous)    Reason for Leave: Radiculopathy/ R neck pain   Start date of leave: 07/19/24  End date of leave: 01/07/25 pending re-eval   How many flare ups per month/length?:  How many appts per month/length?:   Was Fee and Turnaround info Given?:.Yes

## 2024-10-23 NOTE — TELEPHONE ENCOUNTER
Message below noted.    Pt requesting work letter. Letter initiated and pended.     Surgery scheduled 11/12/24. 4 week post-op scheduled for 12/09/24.     LOV 10/21/24  \"ASSESSMENT:  RUE radiculopathy     Plan:  Recommend C5-7 ADR\"    Routed to Provider.

## 2024-10-25 NOTE — TELEPHONE ENCOUNTER
Message below noted.    Advised pt of message and to reach back out with any other questions or concerns.

## 2024-10-28 ENCOUNTER — TELEPHONE (OUTPATIENT)
Dept: SURGERY | Facility: CLINIC | Age: 39
End: 2024-10-28

## 2024-10-28 DIAGNOSIS — M54.12 CERVICAL RADICULOPATHY: Primary | ICD-10-CM

## 2024-10-28 NOTE — TELEPHONE ENCOUNTER
Patient is scheduled for C5-6, C6-7 ADR on 11/12/2024 with Dr. Gerard Cid at [facility].     Y pre-op apt scheduled (if sx is more than 30 days from last apt)  Y pre-op apt scheduled with RN spine navigator  Y Surgical instructions reviewed by nursing staff with patient  Y  form completed  Y Surgery order signed   Y Placed sx on surgery sheet  Y Placed on outlook calendar  Y [employer] message sent to patient with sx instructions  Y Faxed pre-op clearance request to PCP GLORIA GENTILE Faxed letter to prescribing provider requesting anticoagulants be held for surgery  Y E-mail sent to [employer]  Y Post-op appointments made  Y PA NEEDED. Routed to PA team to initiate.  Y Post-Op outreach pt reminder placed.   Y Entire Neurosurgery Checklist Completed    Clearances: PCP  PA:[facility]  CPT Codes: 37450, 05965, 67792

## 2024-10-29 ENCOUNTER — OFFICE VISIT (OUTPATIENT)
Dept: FAMILY MEDICINE CLINIC | Facility: CLINIC | Age: 39
End: 2024-10-29
Payer: COMMERCIAL

## 2024-10-29 ENCOUNTER — NURSE ONLY (OUTPATIENT)
Age: 39
End: 2024-10-29
Payer: COMMERCIAL

## 2024-10-29 ENCOUNTER — LABORATORY ENCOUNTER (OUTPATIENT)
Dept: LAB | Age: 39
End: 2024-10-29
Attending: FAMILY MEDICINE
Payer: COMMERCIAL

## 2024-10-29 VITALS
HEIGHT: 66.5 IN | OXYGEN SATURATION: 97 % | RESPIRATION RATE: 12 BRPM | SYSTOLIC BLOOD PRESSURE: 110 MMHG | BODY MASS INDEX: 38.28 KG/M2 | DIASTOLIC BLOOD PRESSURE: 76 MMHG | TEMPERATURE: 98 F | WEIGHT: 241 LBS | HEART RATE: 88 BPM

## 2024-10-29 DIAGNOSIS — Z71.9 ENCOUNTER FOR EDUCATION: Primary | ICD-10-CM

## 2024-10-29 DIAGNOSIS — Z01.812 PRE-OPERATIVE LABORATORY EXAMINATION: ICD-10-CM

## 2024-10-29 DIAGNOSIS — M50.00 CERVICAL DISC DISEASE WITH MYELOPATHY: ICD-10-CM

## 2024-10-29 DIAGNOSIS — Z01.818 PRE-PROCEDURAL EXAMINATION: Primary | ICD-10-CM

## 2024-10-29 DIAGNOSIS — M54.12 CERVICAL RADICULOPATHY: ICD-10-CM

## 2024-10-29 DIAGNOSIS — Z01.812 PRE-OPERATIVE LABORATORY EXAMINATION: Primary | ICD-10-CM

## 2024-10-29 DIAGNOSIS — Z01.810 PREOP CARDIOVASCULAR EXAM: ICD-10-CM

## 2024-10-29 LAB
ANION GAP SERPL CALC-SCNC: 10 MMOL/L (ref 0–18)
ANTIBODY SCREEN: NEGATIVE
ATRIAL RATE: 79 BPM
BASOPHILS # BLD AUTO: 0.06 X10(3) UL (ref 0–0.2)
BASOPHILS NFR BLD AUTO: 0.6 %
BUN BLD-MCNC: 10 MG/DL (ref 9–23)
CALCIUM BLD-MCNC: 10 MG/DL (ref 8.7–10.4)
CHLORIDE SERPL-SCNC: 106 MMOL/L (ref 98–112)
CO2 SERPL-SCNC: 22 MMOL/L (ref 21–32)
CREAT BLD-MCNC: 0.72 MG/DL
EGFRCR SERPLBLD CKD-EPI 2021: 108 ML/MIN/1.73M2 (ref 60–?)
EOSINOPHIL # BLD AUTO: 0.04 X10(3) UL (ref 0–0.7)
EOSINOPHIL NFR BLD AUTO: 0.4 %
ERYTHROCYTE [DISTWIDTH] IN BLOOD BY AUTOMATED COUNT: 13 %
FASTING STATUS PATIENT QL REPORTED: NO
GLUCOSE BLD-MCNC: 179 MG/DL (ref 70–99)
HCT VFR BLD AUTO: 38.7 %
HGB BLD-MCNC: 13 G/DL
IMM GRANULOCYTES # BLD AUTO: 0.04 X10(3) UL (ref 0–1)
IMM GRANULOCYTES NFR BLD: 0.4 %
LYMPHOCYTES # BLD AUTO: 2.7 X10(3) UL (ref 1–4)
LYMPHOCYTES NFR BLD AUTO: 26.9 %
MCH RBC QN AUTO: 28.8 PG (ref 26–34)
MCHC RBC AUTO-ENTMCNC: 33.6 G/DL (ref 31–37)
MCV RBC AUTO: 85.6 FL
MONOCYTES # BLD AUTO: 0.56 X10(3) UL (ref 0.1–1)
MONOCYTES NFR BLD AUTO: 5.6 %
NEUTROPHILS # BLD AUTO: 6.63 X10 (3) UL (ref 1.5–7.7)
NEUTROPHILS # BLD AUTO: 6.63 X10(3) UL (ref 1.5–7.7)
NEUTROPHILS NFR BLD AUTO: 66.1 %
OSMOLALITY SERPL CALC.SUM OF ELEC: 290 MOSM/KG (ref 275–295)
P AXIS: 35 DEGREES
P-R INTERVAL: 176 MS
PLATELET # BLD AUTO: 361 10(3)UL (ref 150–450)
POTASSIUM SERPL-SCNC: 3.7 MMOL/L (ref 3.5–5.1)
Q-T INTERVAL: 380 MS
QRS DURATION: 84 MS
QTC CALCULATION (BEZET): 435 MS
R AXIS: 6 DEGREES
RBC # BLD AUTO: 4.52 X10(6)UL
RH BLOOD TYPE: NEGATIVE
SODIUM SERPL-SCNC: 138 MMOL/L (ref 136–145)
T AXIS: 37 DEGREES
VENTRICULAR RATE: 79 BPM
WBC # BLD AUTO: 10 X10(3) UL (ref 4–11)

## 2024-10-29 PROCEDURE — 86900 BLOOD TYPING SEROLOGIC ABO: CPT

## 2024-10-29 PROCEDURE — 99214 OFFICE O/P EST MOD 30 MIN: CPT | Performed by: FAMILY MEDICINE

## 2024-10-29 PROCEDURE — 87081 CULTURE SCREEN ONLY: CPT

## 2024-10-29 PROCEDURE — 93000 ELECTROCARDIOGRAM COMPLETE: CPT | Performed by: FAMILY MEDICINE

## 2024-10-29 PROCEDURE — 86901 BLOOD TYPING SEROLOGIC RH(D): CPT

## 2024-10-29 PROCEDURE — 86850 RBC ANTIBODY SCREEN: CPT

## 2024-10-29 NOTE — PROGRESS NOTES
RN Spine Navigator Education for [place]     If you have received instructions from your surgeon that are different from those listed below, please follow your physician's instructions.    You are scheduled for a Arthroplasty with Dr. Cid on 11/12.      Patient Surgical Goals: Decreased pain and stiffness    Before Your Surgery    Choose a Care Partner  Patient attended spine navigator visit with care partner.  Care partner is Giorgio. Your care partner(s) should be able to provide transportation to and from the hospital. They should be able to help at home for the first week after discharge, including helping you with meals, medication, and dressing changes.    Clearance Before Surgery  You will need to see your primary care provider within 30 days before surgery. Please make sure this appointment is at least a week before surgery as more testing or doctor visits may be ordered. Presurgical testing may include labs, nasal swab, imaging, EKG.   Make sure that you complete all preadmission testing so that surgery does not get delayed.    Home Environment  Assessed home status: home has stairs, bathroom and bedroom are upstairs, patient has dependents at home, and patient has pets. Suggested arrangements for pet care and help at home.  It is recommended that you prepare your home by putting clean sheets on your bed, freezing meals, and putting frequently used items at waist level.   Prevent falls by removing items that could cause you to trip, adding nightlights and adding a nonskid mat in shower.   Assistive devices can be purchased at a medical supply store or online including canes, walkers, toileting devices (commodes, raised toilet seats, toilet paper wiping aid), long handled sponge, shower chair or tub transfer bench, grabber/reacher tool, sock aid, long handled shoehorn, if needed.      Tobacco, Nicotine and Marijuana Use   Not applicable    Medications to Stop   For 7-10 days before surgery do not take any  blood thinning medications. This includes non-steroidal anti-inflammatories or NSAIDs (Advil, Aleve, Motrin, ibuprofen, naproxen, meloxicam, diclofenac, celecoxib, etc.), aspirin (unless told otherwise by your cardiologist or surgeon), herbal supplements and vitamins (garlic, turmeric, vitamin E, fish oil or krill oil, etc.). You may only take Tylenol or prescribed narcotic medication if needed for pain.   Other medications to stop include: hold oral DM medications day of surgery but check with providers about insulin, No adderall/stimulants the day of surgery.     Leading Up to Day of Surgery  Five days before surgery wash with Hibiclens soap after your regular body soap every day. Do not put use Hibiclens on your face, hair or privates. Your last shower should be the night before surgery. and use mupirocin (Bactroban) if prescribed.   One-two business days before surgery, the preadmission testing staff will call you and let you know what time to arrive, where to park, when to take your Tylenol and Gatorade, and will provide any additional instructions.   After 11pm the day before surgery, do not eat or drink anything (including water, gum, or candies) except for Tylenol and Gatorade.   Drink 12 ounces of regular Gatorade (NOT RED) 12 hours prior to your scheduled surgery time. Drink your second 12 ounces of regular Gatorade and take 1000 mg of Tylenol (acetaminophen) 4 hours before your scheduled surgery time.     Items to Bring to the Hospital   Bring insurance card, ID, advanced directive, or medical power of  paperwork, loose fitting clothing, shoes with a back that can accommodate swollen feet, long phone charging cord.   Do not bring jewelry, valuables, or medications.   If you take an uncommon medication that the hospital may not have, it must be brought to the hospital in the original container, and you must notify the nurse of this medication.     In the Hospital     Operative Day and Hospital  Stay  In the preoperative area, you will change into a gown, have an IV placed in your hand or arm by the nurse, and sign any consent paperwork that is needed for your procedure. You will speak to the surgeon and anesthesiologist. It is important to tell the doctors and nurses if you have had any significant side effects from pain medications or anesthesia such as a rash or severe nausea.    In the operating room, the anesthesiologist will attach monitors, give you oxygen through a mask, and give you medicine through the IV to fall asleep. After you are sleeping, the breathing tube will be placed. The surgeon may use additional nerve monitoring during your surgery. This is to make sure that the muscles and nerves in your arms and legs are working normally as he operates. The equipment will be hooked up and removed while you are asleep. You will wake up on the hospital bed.     During the surgery, your care partner can sit in the surgical waiting area. There are TV screens in that area that keep them informed of your progress. If the procedure is at [facility], there is a person who can speak to them about your surgical progress.     In the recovery room, monitors will be attached that check your heart rate, blood pressure and oxygen levels. While you may not remember this part, a nurse is with you and constantly checking on your condition. Medications for pain and nausea will be given if you need them. You may have a manzanares catheter to empty your bladder or a drain at your surgical site. Your family is not allowed in the recovery room. When you are ready to leave the recovery room, you will be transported on your bed to the inpatient unit accompanied by your family once a room is available.  On the inpatient unit, a team of doctors and advanced practice providers will manage your care. The spine care nurses will check your blood pressure, temperature, heartbeat, breathing, stomach sounds and your incision. They may  assess the strength and sensation in your arms and legs. Medications that are given in the hospital include antibiotics, IV fluids, pain medications, muscle relaxers, stool softeners, and your home medications. You may get blood drawn and another x-ray. Physical and occupational therapists may come to your room to teach you how to move around safely after surgery.     Post Op Plan   The average length of hospital stay is one to two days. A  may visit you to help arrange extra care for you once you leave the hospital. Occasionally, it is recommended that a home health nurse or therapist visit you in your home for a short time. The best place to recover is your own home, but if you need more assistance than home health and your care partner can provide, the  will help you and your family choose other facilities to help you recover your strength.    Preventing surgical complications  It is important to follow all instructions before and after surgery to decrease the risks of surgery and prevent complications.     Blood clots: walk, wear leg compression devices in the hospital, and do ankle pumps at home  Infection: wash with Hibiclens before surgery, wash your hands, don't touch your incision  Pneumonia: take deep breaths and cough and use the breathing exercise (incentive spirometer)   Nausea/vomiting: start with liquids and small meals and do not take pills on an empty stomach  Constipation: drink water and walk frequently    Tell your nurse if you are experiencing nausea, vomiting or constipation as they have medications to help treat these.      At home     Understanding Pain After Surgery  We will do our best to manage your pain after surgery, but it is not possible to be completely pain-free. There will be operative pain in your back or neck. Pain in the arms or legs may be one of the first things to improve. Numbness, weakness, and tingling should improve over time. However, there can be  a temporary increase in symptoms in the first few days due to inflammation from surgery.   Pain medications will be prescribed to take home at discharge. The goal of pain medicine after surgery is to make your pain tolerable, not to make you pain free. We encourage you to use the medication prescribed to you after your surgery, but please take the lowest possible dose to manage your pain. Taking high doses of narcotics can cause side effects. Transition to plain Tylenol when your pain improves. At [facility], your prescriptions can be filled by our pharmacy and brought to you bedside. You may get more continuous pain relief by alternating between medications if you have multiple instead of taking them at the same time. Write down when you have taken a medication as it may be difficult to remember after a few doses.    Post operative medication   Tylenol (acetaminophen): take for pain. Do not take more than 3000 mg - 4000 mg in 24 hours because it can damage your liver.   Narcotics: take for moderate to severe pain. Do not drink alcohol or drive while taking narcotics. Some narcotic medications (Norco, Tylenol #3, Percocet, Vicodin) contain Tylenol (acetaminophen). Make sure to not exceed the maximum dose if you are taking additional Tylenol with these medications.  Muscle relaxers: take for muscle cramping. These can cause drowsiness.    NSAIDS (Advil, Aleve, Motrin, ibuprofen, naproxen, meloxicam, diclofenac, celecoxib, etc.) or aspirin: Do not take these unless your physician says it is ok.  Stool softeners: take to prevent constipation while you are taking narcotic medications. You can get these over the counter at the pharmacy. You may use laxatives, which are stronger, if needed.    If you believe you will need more of medication your surgeon has prescribed to you, request a refill through your pharmacy or through the refill request in [employer] (log in, go to medications, then select refill request) at  least two business days before you run out of medication.     Nonpharmacologic pain management   You may use ice on your incision for 20 minutes every hour to help with pain and swelling. Do not place ice directly on your skin. You can use heat to sooth your muscles but avoid placing heat directly on your incision. Make frequent position changes. You can do gentle stretching while avoiding significant bending or twisting. Use deep breathing techniques and distractions such as TV, music, reading, or games.   Additional Recommendations for Anterior Cervical Surgery  Smoothies or protein shakes may be more comfortable to consume then solid food for the first week after surgery. To help decrease throat swelling, suck on ice chips and drink cold liquids. Cough drops can also be help decrease throat irritation.    Movement restrictions  After surgery, no bending or twisting your neck or back. Do not lift anything over 10lbs (about the weight of a gallon of milk) or lift anything over your shoulders. Avoid pulling or pushing. You may use stairs while holding the handrail.  It is ok to ride in the car but refrain from driving or traveling long distances until cleared to do so by your surgeon. You may not drive while taking narcotics or muscle relaxants. If you have cervical surgery, it may be several weeks before you can drive. , If you had a fracture or fusion surgery, your doctor may give you a brace. Braces are worn for comfort, when up and moving around, or constantly depending on your doctor's order. Wear your brace as instructed.     Post Op Exercise   Walk frequently. Start with walking short distances and increase as you start to feel better. Do ankle pumps (bending at your ankles, bring your feet towards your head then point them towards the ground) 15-20 times every hour when awake to help prevent blood clots.     Your surgeon will let you know at your post operative appointment if you are ready to decrease your  movement restrictions and increase your exercise. If you have questions in between appointments about lessening your restrictions, please contact the office.     You and your doctor will discuss how you are feeling as you heal and decide if outpatient physical therapy or a medical fitness referral is needed.    Caring for your incision  Always wash your hands before touching your incision. Your incision will be closed with sutures under the skin and skin glue or Steri-Strips (thin white bandages) on top of the skin. Do not attempt to peal off Skin glue/Steri-Strips as they will come off on their own. If the incision is closed with sutures or staples on top of the skin, they will be removed at a post op appointment. The incision may be covered with a gauze dressing that can come off after three days. Once the original gauze dressing is removed, we prefer that you leave your incision open to air (without a gauze dressing). If the incision has drainage or is rubbing against your clothes or brace, you may place gauze and medical tape over it. Change the gauze and medical tape daily. Look at your incision daily to check for signs of infection.   You can shower three days after your surgery or sooner if your surgeon allows. We recommend the care partner be present during the first shower for safety. Let soapy water run over the incision, but do not scrub it or spray it directly. Gently pat it dry after with a clean towel. Do not apply any creams or lotions to the incision. Do not soak in a tub, pool, or any body of water until your incision is fully healed.    Signs of Infection   Check your incision daily for swelling, redness, drainage, pus, bad smell, or opening of the incision.     When to Call for Assistance  Call the Neurosurgery Office ([phone] Option #2) if you experience signs of infection, opening of the incision, continuous nausea or vomiting, poor pain control despite using the pain medication as  directed, a sudden increase in pain, temperature over 101F, difficulty swallowing, leg swelling, or with any concerns, unanswered questions, or new problems, such as new numbness/weakness/tingling.  Call 911 or go to the nearest emergency room if you experience chest pain, difficulty breathing, loss of bowel or bladder control, extreme drowsiness, or any other life-threatening situation.     Follow-up Plan   Appointments with Dr. Cid or Tatiana at 1, 4 and 12 weeks    Answered questions regarding: visiting policies      You can contact the RN Spine Navigator at [phone] or [email or web address] with additional questions or feedback on your care. It may take several business days to receive a reply so please do not call the RN spine navigator for refills or for emergencies.    Spine navigator spent 35 minutes conducting a virtual visit to provide education. Thank you for letting the RN Spine Navigator participate in your care.

## 2024-10-29 NOTE — PROGRESS NOTES
Called patient. Patient number did not go through.   Need information related to her past pregnancy and delivery.  Next appt is 8/22/23.      PRE-OP Physical   What testing is needed for this surgery/patient?  H&P , EKG, cbc,bmp,pt/inr ,ptt ,type and screen , mssa/mrsa nasal screening   What is the full name of procedure/ surgery?  Anterior cervical 5 ,cervical 6,cervical 7 , discectomy and arthroplasty  Date being surgery or procedure is being done? 11/12/24  What is the doctor’s full name  that is doing the surgery?  RONNI Cid DO   What hospital or facility will it  be done at? [facility]   Have you  ever had anesthesia before? Yes   If yes, any previous complications with anesthesia? No     Fax results to [phone]

## 2024-10-29 NOTE — H&P
Madison Villegas is a 39 year old female.  Chief Complaint   Patient presents with    Pre-Op       HPI:   PREOP EXAM    PRE-OP Physical   What testing is needed for this surgery/patient?  H&P , EKG, cbc,bmp,pt/inr ,ptt ,type and screen , mssa/mrsa nasal screening   What is the full name of procedure/ surgery?  Anterior cervical 5 ,cervical 6,cervical 7 , discectomy and arthroplasty  Date being surgery or procedure is being done? 11/12/24  What is the doctor’s full name  that is doing the surgery?  RONNI Cid DO   What hospital or facility will it  be done at? [facility]   Have you  ever had anesthesia before? Yes   If yes, any previous complications with anesthesia? No     Fax results to [phone]      MEDICALLY CLEAR FOR SURGERY      ALLERGIES:  Allergies[1]       has a past surgical history that includes other surgical history (Left, 9/2007).   has a past medical history of Anxiety (02/15/2019), Depression, Diabetes (HCC), motion sickness, Muscle weakness, and Visual impairment.    She has no past medical history of Anesthesia complication, Difficult intubation, Family history of malignant hyperthermia, Family history of pseudocholinesterase deficiency, History of adverse reaction to anesthesia, Malignant hyperthermia, PONV (postoperative nausea and vomiting), or Pseudocholinesterase deficiency.  family history includes Other in her mother.     has a current medication list which includes the following prescription(s): melatonin, cyclobenzaprine, sertraline, amphetamine-dextroamphetamine er, metformin er, dexcom g6 sensor, onetouch verio, spironolactone, vitamin d, loratadine, nexplanon, levothyroxine, and dexcom g6 transmitter.        Social History:  Social History     Socioeconomic History    Marital status:    Tobacco Use    Smoking status: Never    Smokeless tobacco: Never   Vaping Use    Vaping status: Never Used   Substance and Sexual Activity    Alcohol use: No    Drug use: Never      Social Drivers of Health      [facility]    Social Connections    [facility]    Housing Stability          REVIEW OF SYSTEMS:   GENERAL HEALTH: feels well no complaints  SKIN: denies any unusual skin lesions or rashes  RESPIRATORY: denies shortness of breath with exertion  CARDIOVASCULAR: denies chest pain on exertion  GI: denies abdominal pain and denies heartburn  NEURO: denies headaches    EXAM:   /76 (BP Location: Right arm, Patient Position: Sitting, Cuff Size: large)   Pulse 88   Temp 98.2 °F (36.8 °C) (Temporal)   Resp 12   Ht 5' 6.5\" (1.689 m)   Wt 241 lb (109.3 kg)   SpO2 97%   BMI 38.32 kg/m²  Body mass index is 38.32 kg/m².      GENERAL: well developed, well nourished,in no apparent distress  SKIN: no rashes,no suspicious lesions  HEENT: atraumatic, normocephalic,ears and throat are clear  NECK: supple,no adenopathy,no bruits  LUNGS: clear to auscultation  CARDIO: RRR without murmur  GI: good BS's,no masses, HSM or tenderness  EXTREMITIES: no cyanosis, clubbing or edema    ASSESSMENT AND PLAN:     1. Pre-procedural examination (Primary)  Comments:  medically clear for surgery  2. Preop cardiovascular exam  3. Pre-operative laboratory examination  4. Cervical radiculopathy  Comments:  for surgical intervention 11/12/2024  5. Cervical disc disease with myelopathy  Comments:  for surgical intervention 11/12/2024          EKG  INTERPRETED BY DR POWELL           Component  Ref Range & Units 12:02 PM   Ventricular rate  BPM 79   Atrial rate  BPM 79   P-R Interval  ms 176   QRS Duration  ms 84   Q-T Interval  ms 380   QTC Calculation (Bezet)  ms 435   P Axis  degrees 35   R Axis  degrees 6   T Axis  degrees 37        Narrative  Normal sinus rhythm  Normal ECG  No previous ECGs found in [facility]  Confirmed by Warren Powell ([id number]) on 10/29/2024 2:26:03 PM                         [1]   Allergies  Allergen Reactions    Alcohol HIVES     Liquor only    Doxycycline HIVES

## 2024-10-29 NOTE — TELEPHONE ENCOUNTER
Patient called for status, informed patient of provider response.  Patient acknowledged and would like form completed for continuous leave 10/21/24 - 1/07/25.  Patient states form due 11/03/24.  Rep informed

## 2024-10-29 NOTE — TELEPHONE ENCOUNTER
Prior Authorization for Outpatient surgery initiated with Reinier CASTRO with [facility] at [phone].  Requesting coverage for: C5-6, C6-7 ADR    Date of Service: 11/12/24   Inpatient days requested:Outpatient  CPT codes: 12334, 21384, 05022    Request for surgery pending review, pending reference #[id number]. Clinical notes uploaded on [facility] portal.

## 2024-10-30 LAB
APTT PPP: 28.4 SECONDS (ref 23–36)
BILIRUB UR QL STRIP.AUTO: NEGATIVE
GLUCOSE UR STRIP.AUTO-MCNC: NORMAL MG/DL
INR BLD: 0.97 (ref 0.8–1.2)
KETONES UR STRIP.AUTO-MCNC: NEGATIVE MG/DL
LEUKOCYTE ESTERASE UR QL STRIP.AUTO: NEGATIVE
NITRITE UR QL STRIP.AUTO: NEGATIVE
PH UR STRIP.AUTO: 5.5 [PH] (ref 5–8)
PROTHROMBIN TIME: 13 SECONDS (ref 11.6–14.8)
RBC UR QL AUTO: NEGATIVE
SP GR UR STRIP.AUTO: 1.02 (ref 1–1.03)
UROBILINOGEN UR STRIP.AUTO-MCNC: NORMAL MG/DL

## 2024-10-31 ENCOUNTER — TELEPHONE (OUTPATIENT)
Dept: SURGERY | Facility: CLINIC | Age: 39
End: 2024-10-31

## 2024-10-31 NOTE — TELEPHONE ENCOUNTER
Left voicemail to inform patient of dr. Cid's response - patient will need to contact provider that was overseeing care prior to dr. Cid's evaluation to get paperwork backdated.

## 2024-10-31 NOTE — TELEPHONE ENCOUNTER
Fax received from Dr. Powell's (PCP) office regarding pre-op testing results. Documents left in nurses bin for provider review.

## 2024-10-31 NOTE — TELEPHONE ENCOUNTER
PCP presurgical clearance received per OV note in chart dated 10.29.24, \"Medically clear for surgery\"

## 2024-10-31 NOTE — TELEPHONE ENCOUNTER
Thao Cid,    Patient is requesting a continuous leave. She has been off work since July (informed patient to reach out to provider who she was seeing then) and is asking to remain completely off work until 1/7/25. Your response indicated you wanted her on an intermittent leave.    Do you support this request?  Thanks!  Karen Luna Department

## 2024-11-01 NOTE — TELEPHONE ENCOUNTER
Dr. Cid,    **The ACKNOWLEDGE button has been moved to the top right ribbon**    Please sign off on form if you agree to: disability  (place your signature on the first page only)  -From your Inbasket, Highlight the patient and click Chart  -Double click the 10/21/24 Forms Completion telephone encounter  -Scroll down to the Media section  -Click the blue Hyperlink: Cristiano Cid 11/1/24    -Click Acknowledge located in the top right ribbon/menu  -Drag the mouse into the blank space of the document and a + sign will appear. Left click to  electronically sign the document.    Thank you,    Karen

## 2024-11-04 ENCOUNTER — OFFICE VISIT (OUTPATIENT)
Dept: SURGERY | Facility: CLINIC | Age: 39
End: 2024-11-04
Payer: COMMERCIAL

## 2024-11-04 VITALS
BODY MASS INDEX: 38.73 KG/M2 | HEIGHT: 66 IN | SYSTOLIC BLOOD PRESSURE: 124 MMHG | WEIGHT: 241 LBS | HEART RATE: 67 BPM | DIASTOLIC BLOOD PRESSURE: 82 MMHG

## 2024-11-04 DIAGNOSIS — Z98.890 S/P CERVICAL DISC REPLACEMENT: Primary | ICD-10-CM

## 2024-11-04 PROBLEM — G89.29 CHRONIC BILATERAL LOW BACK PAIN WITHOUT SCIATICA: Status: ACTIVE | Noted: 2023-11-02

## 2024-11-04 PROBLEM — M54.2 NECK PAIN: Status: ACTIVE | Noted: 2023-11-02

## 2024-11-04 PROBLEM — M54.50 CHRONIC BILATERAL LOW BACK PAIN WITHOUT SCIATICA: Status: ACTIVE | Noted: 2023-11-02

## 2024-11-04 PROBLEM — R22.32 SUBCUTANEOUS MASS OF LEFT THUMB: Status: ACTIVE | Noted: 2024-01-16

## 2024-11-04 PROCEDURE — 99212 OFFICE O/P EST SF 10 MIN: CPT | Performed by: NEUROLOGICAL SURGERY

## 2024-11-04 NOTE — PATIENT INSTRUCTIONS
Refill policies:    Allow 2-3 business days for refills; controlled substances may take longer.  Contact your pharmacy at least 5 days prior to running out of medication and have them send an electronic request or submit request through the “request refill” option in your [employer] account.  Refills are not addressed on weekends; covering physicians do not authorize routine medications on weekends.  No narcotics or controlled substances are refilled after noon on Fridays or by on call physicians.  By law, narcotics must be electronically prescribed.  A 30 day supply with no refills is the maximum allowed.  If your prescription is due for a refill, you may be due for a follow up appointment.  To best provide you care, patients receiving routine medications need to be seen at least once a year.  Patients receiving narcotic/controlled substance medications need to be seen at least once every 3 months.  In the event that your preferred pharmacy does not have the requested medication in stock (e.g. Backordered), it is your responsibility to find another pharmacy that has the requested medication available.  We will gladly send a new prescription to that pharmacy at your request.    Scheduling Tests:    If your physician has ordered radiology tests such as MRI or CT scans, please contact Central Scheduling at [phone] right away to schedule the test.  Once scheduled, the [facility] Centralized Referral Team will work with your insurance carrier to obtain pre-certification or prior authorization.  Depending on your insurance carrier, approval may take 3-10 days.  It is highly recommended patients assure they have received an authorization before having a test performed.  If test is done without insurance authorization, patient may be responsible for the entire amount billed.      Precertification and Prior Authorizations:  If your physician has recommended that you have a procedure or additional testing performed the [facility]  Centralized Referral Team will contact your insurance carrier to obtain pre-certification or prior authorization.    You are strongly encouraged to contact your insurance carrier to verify that your procedure/test has been approved and is a COVERED benefit.  Although the [facility] Centralized Referral Team does its due diligence, the insurance carrier gives the disclaimer that \"Although the procedure is authorized, this does not guarantee payment.\"    Ultimately the patient is responsible for payment.   Thank you for your understanding in this matter.  Paperwork Completion:  If you require FMLA or disability paperwork for your recovery, please make sure to either drop it off or have it faxed to our office at [phone]. Be sure the form has your name and date of birth on it.  The form will be faxed to our Forms Department and they will complete it for you.  There is a 25$ fee for all forms that need to be filled out.  Please be aware there is a 10-14 day turnaround time.  You will need to sign a release of information (SRINIVAS) form if your paperwork does not come with one.  You may call the Forms Department with any questions at [phone].  Their fax number is [phone].

## 2024-11-04 NOTE — TELEPHONE ENCOUNTER
Patient called back and states prior auth to call [facility] and state it is to be done stat because it can take up to 15days.

## 2024-11-04 NOTE — H&P (VIEW-ONLY)
[facility]  Neurological Surgery Clinic Note     Madison Villegas  8/21/1985  [id number]  PCP: Warren Powell MD     REASON FOR VISIT:  RUE radiculopathy     HISTORY OF PRESENT ILLNESS:  Madison Villegas is a(n) 39 year old female RUE radiculopathy. Patient reports symptoms began a few months ago. She has undergone PT and OTC meds with no releif.  She has had an PHAM with some improvement and is scheduled for a median n. Branch block.  She has no weakness. Symptoms radiate in the C6 nerve distribution.  She has no wekaness. She has no gait instability.  She denies any trauma nor falls.         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     SOCIAL HISTORY:   reports that she has never smoked. She has never used smokeless tobacco. She reports that she does not drink alcohol and does not use drugs.     ALLERGIES:  [Allergies]    [Allergies]       Allergen Reactions    Doxycycline HIVES      MEDICATIONS:  [Medications Ordered Prior to Encounter]    [Medications Ordered Prior to Encounter]         Current Outpatient Medications on File Prior to Visit   Medication Sig Dispense Refill    melatonin 5 MG Oral Cap Take 1 capsule (5 mg total) by mouth nightly.        SERTRALINE 50 MG Oral Tab TAKE 1 TABLET DAILY 90 tablet 1    amphetamine-dextroamphetamine ER (ADDERALL XR) 10 MG Oral Capsule SR 24 Hr Take 1 capsule (10 mg total) by mouth every  morning. 30 capsule 0    metFORMIN  MG Oral Tablet 24 Hr          Continuous Blood Gluc Sensor (DEXCOM G6 SENSOR) Does not apply Misc 1 Device Every 10 days. CHANGE SENSOR EVERY 10 DAYS        Continuous Blood Gluc Transmit (DEXCOM G6 TRANSMITTER) Does not apply Misc 1 Device every 3 (three) months.        temazepam 15 MG Oral Cap Take 1 capsule (15 mg total) by mouth nightly as needed for Sleep. 30 capsule 0    Blood Glucose Monitoring Suppl (ONETOUCH VERIO FLEX SYSTEM) w/Device Does not apply Kit Apply 1 Application topically daily.        Glucose Blood (ONETOUCH VERIO) In Vitro Strip USE 2 STRIPS TWICE DAILY        Lancets (ONETOUCH DELICA PLUS [id number]) Does not apply Misc 1 strip by In Vitro route daily.        spironolactone 100 MG Oral Tab          Cholecalciferol (VITAMIN D OR) Take 5,000 Units by mouth 3 (three) times daily.        loratadine (CLARITIN) 10 MG Oral Tab Take 1 tablet (10 mg total) by mouth daily.        NEXPLANON 68 MG Subcutaneous Implant          levothyroxine (SYNTHROID) 50 MCG Oral Tab Take 1 tablet (50 mcg total) by mouth daily. 30 tablet 0    cyclobenzaprine 10 MG Oral Tab Take 1 tablet (10 mg total) by mouth 3 (three) times daily as needed for Muscle spasms. (Patient not taking: Reported on 10/21/2024) 30 tablet 0    MOUNJARO 2.5 MG/0.5ML Subcutaneous Solution Pen-injector 2.5 mg by Implant route once a week. (Patient not taking: Reported on 10/21/2024)        albuterol 108 (90 Base) MCG/ACT Inhalation Aero Soln Inhale 2 puffs into the lungs every 6 (six) hours as needed. (Patient not taking: Reported on 10/21/2024) 1 each 0      No current facility-administered medications on file prior to visit.         REVIEW OF SYSTEMS:  Review of Systems   All other systems reviewed and are negative.        PHYSICAL EXAMINATION:  Neurological Exam  Mental Status  Awake, alert and oriented to person, place and time.     Cranial Nerves  CN I: Sense of smell is normal.  CN II: Visual acuity is  normal. Visual fields full to confrontation.  CN III, IV, VI: Extraocular movements intact bilaterally. Normal lids and orbits bilaterally. Pupils equal round and reactive to light bilaterally.  CN V: Facial sensation is normal.  CN VII: Full and symmetric facial movement.  CN VIII: Hearing is normal.  CN IX, X: Palate elevates symmetrically. Normal gag reflex.  CN XI: Shoulder shrug strength is normal.  CN XII: Tongue midline without atrophy or fasciculations.     Motor  Normal muscle bulk throughout. No fasciculations present. Normal muscle tone. No abnormal involuntary movements. Strength is 5/5 throughout all four extremities.     Sensory  Sensation is intact to light touch, pinprick, vibration and proprioception in all four extremities.     Reflexes  Deep tendon reflexes are 2+ and symmetric in all four extremities.     Coordination     Finger-to-nose, rapid alternating movements and heel-to-shin normal bilaterally without dysmetria.     Gait  Normal casual, toe, heel and tandem gait.        IMAGING:  As above     ASSESSMENT:  RUE radiculopathy     Plan:  Plan for C5-7 ADR-risks and benefits discussed at length        The plan of care was discussed with the patient at length. All questions and concerns were addressed at this time. The patient verbalized agreement with the plan and was appreciative.      Total visit time: 30 minutes; More than 50% spent coordinating care, counseling, reviewing imaging and discussing medication therapy.      Mary Cid,   Neurological Surgery  [facility]

## 2024-11-04 NOTE — PROGRESS NOTES
[facility]  Neurological Surgery Clinic Note     Madison Villegas  8/21/1985  [id number]  PCP: Warren Powell MD     REASON FOR VISIT:  RUE radiculopathy     HISTORY OF PRESENT ILLNESS:  Madison Villegas is a(n) 39 year old female RUE radiculopathy. Patient reports symptoms began a few months ago. She has undergone PT and OTC meds with no releif.  She has had an PHAM with some improvement and is scheduled for a median n. Branch block.  She has no weakness. Symptoms radiate in the C6 nerve distribution.  She has no wekaness. She has no gait instability.  She denies any trauma nor falls.         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     SOCIAL HISTORY:   reports that she has never smoked. She has never used smokeless tobacco. She reports that she does not drink alcohol and does not use drugs.     ALLERGIES:  [Allergies]    [Allergies]       Allergen Reactions    Doxycycline HIVES      MEDICATIONS:  [Medications Ordered Prior to Encounter]    [Medications Ordered Prior to Encounter]         Current Outpatient Medications on File Prior to Visit   Medication Sig Dispense Refill    melatonin 5 MG Oral Cap Take 1 capsule (5 mg total) by mouth nightly.        SERTRALINE 50 MG Oral Tab TAKE 1 TABLET DAILY 90 tablet 1    amphetamine-dextroamphetamine ER (ADDERALL XR) 10 MG Oral Capsule SR 24 Hr Take 1 capsule (10 mg total) by mouth every  morning. 30 capsule 0    metFORMIN  MG Oral Tablet 24 Hr          Continuous Blood Gluc Sensor (DEXCOM G6 SENSOR) Does not apply Misc 1 Device Every 10 days. CHANGE SENSOR EVERY 10 DAYS        Continuous Blood Gluc Transmit (DEXCOM G6 TRANSMITTER) Does not apply Misc 1 Device every 3 (three) months.        temazepam 15 MG Oral Cap Take 1 capsule (15 mg total) by mouth nightly as needed for Sleep. 30 capsule 0    Blood Glucose Monitoring Suppl (ONETOUCH VERIO FLEX SYSTEM) w/Device Does not apply Kit Apply 1 Application topically daily.        Glucose Blood (ONETOUCH VERIO) In Vitro Strip USE 2 STRIPS TWICE DAILY        Lancets (ONETOUCH DELICA PLUS [id number]) Does not apply Misc 1 strip by In Vitro route daily.        spironolactone 100 MG Oral Tab          Cholecalciferol (VITAMIN D OR) Take 5,000 Units by mouth 3 (three) times daily.        loratadine (CLARITIN) 10 MG Oral Tab Take 1 tablet (10 mg total) by mouth daily.        NEXPLANON 68 MG Subcutaneous Implant          levothyroxine (SYNTHROID) 50 MCG Oral Tab Take 1 tablet (50 mcg total) by mouth daily. 30 tablet 0    cyclobenzaprine 10 MG Oral Tab Take 1 tablet (10 mg total) by mouth 3 (three) times daily as needed for Muscle spasms. (Patient not taking: Reported on 10/21/2024) 30 tablet 0    MOUNJARO 2.5 MG/0.5ML Subcutaneous Solution Pen-injector 2.5 mg by Implant route once a week. (Patient not taking: Reported on 10/21/2024)        albuterol 108 (90 Base) MCG/ACT Inhalation Aero Soln Inhale 2 puffs into the lungs every 6 (six) hours as needed. (Patient not taking: Reported on 10/21/2024) 1 each 0      No current facility-administered medications on file prior to visit.         REVIEW OF SYSTEMS:  Review of Systems   All other systems reviewed and are negative.        PHYSICAL EXAMINATION:  Neurological Exam  Mental Status  Awake, alert and oriented to person, place and time.     Cranial Nerves  CN I: Sense of smell is normal.  CN II: Visual acuity is  normal. Visual fields full to confrontation.  CN III, IV, VI: Extraocular movements intact bilaterally. Normal lids and orbits bilaterally. Pupils equal round and reactive to light bilaterally.  CN V: Facial sensation is normal.  CN VII: Full and symmetric facial movement.  CN VIII: Hearing is normal.  CN IX, X: Palate elevates symmetrically. Normal gag reflex.  CN XI: Shoulder shrug strength is normal.  CN XII: Tongue midline without atrophy or fasciculations.     Motor  Normal muscle bulk throughout. No fasciculations present. Normal muscle tone. No abnormal involuntary movements. Strength is 5/5 throughout all four extremities.     Sensory  Sensation is intact to light touch, pinprick, vibration and proprioception in all four extremities.     Reflexes  Deep tendon reflexes are 2+ and symmetric in all four extremities.     Coordination     Finger-to-nose, rapid alternating movements and heel-to-shin normal bilaterally without dysmetria.     Gait  Normal casual, toe, heel and tandem gait.        IMAGING:  As above     ASSESSMENT:  RUE radiculopathy     Plan:  Plan for C5-7 ADR-risks and benefits discussed at length        The plan of care was discussed with the patient at length. All questions and concerns were addressed at this time. The patient verbalized agreement with the plan and was appreciative.      Total visit time: 30 minutes; More than 50% spent coordinating care, counseling, reviewing imaging and discussing medication therapy.      Mary Cid,   Neurological Surgery  [facility]

## 2024-11-04 NOTE — TELEPHONE ENCOUNTER
Patient called requesting status of forms. Informed patient forms were completed. Per pts request forms efaxed to Miguel [phone]. Fax confirmed.

## 2024-11-08 NOTE — TELEPHONE ENCOUNTER
Called [facility] at [phone] to check on the case and to ask to expedite since still no decision.      Spoke to Juliet GREEN she said this case doesn't meet the criteria to be expedited.  Case can only be expedited when it's 24 hours before the surgery date.  She said I can call back to expedite on Monday.

## 2024-11-11 ENCOUNTER — TELEPHONE (OUTPATIENT)
Dept: SURGERY | Facility: CLINIC | Age: 39
End: 2024-11-11

## 2024-11-11 NOTE — TELEPHONE ENCOUNTER
Patient called to advise she needs additional forms completed for her claim- per patient they are the exact same forms as before but Mago needs new updated forms due to her upcoming surgery- Provided patient with forms dept email for she can send to us directly and advised our turn around time- Verbal understanding from patient will send forms today

## 2024-11-11 NOTE — TELEPHONE ENCOUNTER
Prior authorization request completed for: C5-6, C6-7 ADR     Authorization #[id number]  Authorization dates: 11/12/24 (11/12/24-02/10/25)  CPT codes approved: 33487, 17104, 29752  Number of visits/dates of service approved: Outpatient   Physician: Jose Roberto  Location: [facility]

## 2024-11-11 NOTE — TELEPHONE ENCOUNTER
Called [facility] at [phone] spoke to Hannah DAVIS Who said she sent an email to expedite the case to have a decision by this afternoon.  Ref#

## 2024-11-11 NOTE — TELEPHONE ENCOUNTER
Received a call from Yvrose with [facility] [phone].  She never received MRI and needed manufacture and device information for surgery.  Faxed MRI and information to [phone], confirmation received.

## 2024-11-11 NOTE — TELEPHONE ENCOUNTER
ITA Cid, DO to Karen Bauer       10/31/24 10:53 AM   Hello    Again I would start her leave with me from the first date of my evaluation.    And yes she can stay off til 1/7     DC    10/31/24  9:38 AM  Karen Bauer routed this conversation to ITA Cid, Karen Metcalf   [place]    10/31/24  9:38 AM  Note     Thao Cid,     Patient is requesting a continuous leave. She has been off work since July (informed patient to reach out to provider who she was seeing then) and is asking to remain completely off work until 1/7/25. Your response indicated you wanted her on an intermittent leave.     Do you support this request?  Thanks!  Karen  Forms Department

## 2024-11-12 ENCOUNTER — ANESTHESIA EVENT (OUTPATIENT)
Dept: SURGERY | Facility: HOSPITAL | Age: 39
End: 2024-11-12
Payer: COMMERCIAL

## 2024-11-12 ENCOUNTER — APPOINTMENT (OUTPATIENT)
Dept: GENERAL RADIOLOGY | Facility: HOSPITAL | Age: 39
End: 2024-11-12
Attending: NEUROLOGICAL SURGERY
Payer: COMMERCIAL

## 2024-11-12 ENCOUNTER — ANESTHESIA (OUTPATIENT)
Dept: SURGERY | Facility: HOSPITAL | Age: 39
End: 2024-11-12
Payer: COMMERCIAL

## 2024-11-12 ENCOUNTER — HOSPITAL ENCOUNTER (OUTPATIENT)
Facility: HOSPITAL | Age: 39
Discharge: HOME | End: 2024-11-13
Attending: NEUROLOGICAL SURGERY | Admitting: NEUROLOGICAL SURGERY
Payer: COMMERCIAL

## 2024-11-12 DIAGNOSIS — M54.12 CERVICAL RADICULOPATHY: Primary | ICD-10-CM

## 2024-11-12 LAB
B-HCG UR QL: NEGATIVE
GLUCOSE BLD-MCNC: 132 MG/DL (ref 70–99)
GLUCOSE BLD-MCNC: 154 MG/DL (ref 70–99)
GLUCOSE BLD-MCNC: 266 MG/DL (ref 70–99)
RH BLOOD TYPE: NEGATIVE

## 2024-11-12 PROCEDURE — 99214 OFFICE O/P EST MOD 30 MIN: CPT | Performed by: INTERNAL MEDICINE

## 2024-11-12 PROCEDURE — 0RG20A0 FUSION OF 2 OR MORE CERVICAL VERTEBRAL JOINTS WITH INTERBODY FUSION DEVICE, ANTERIOR APPROACH, ANTERIOR COLUMN, OPEN APPROACH: ICD-10-PCS | Performed by: NEUROLOGICAL SURGERY

## 2024-11-12 PROCEDURE — 76000 FLUOROSCOPY <1 HR PHYS/QHP: CPT | Performed by: NEUROLOGICAL SURGERY

## 2024-11-12 DEVICE — TOTAL CERVICAL DISC REPLACEMENT SYSTEM: MOBI-C CERVICAL DISC PROSTHESIS
Type: IMPLANTABLE DEVICE | Site: SPINE CERVICAL | Status: FUNCTIONAL
Brand: MOBI-C® PLUG & FIT US

## 2024-11-12 RX ORDER — ACETAMINOPHEN 10 MG/ML
1000 INJECTION, SOLUTION INTRAVENOUS ONCE
Status: COMPLETED | OUTPATIENT
Start: 2024-11-12 | End: 2024-11-12

## 2024-11-12 RX ORDER — MIDAZOLAM HYDROCHLORIDE 1 MG/ML
INJECTION INTRAMUSCULAR; INTRAVENOUS AS NEEDED
Status: DISCONTINUED | OUTPATIENT
Start: 2024-11-12 | End: 2024-11-12 | Stop reason: SURG

## 2024-11-12 RX ORDER — METHOCARBAMOL 100 MG/ML
INJECTION, SOLUTION INTRAMUSCULAR; INTRAVENOUS
Status: COMPLETED
Start: 2024-11-12 | End: 2024-11-12

## 2024-11-12 RX ORDER — DIPHENHYDRAMINE HYDROCHLORIDE 50 MG/ML
12.5 INJECTION INTRAMUSCULAR; INTRAVENOUS EVERY 4 HOURS PRN
Status: DISCONTINUED | OUTPATIENT
Start: 2024-11-12 | End: 2024-11-13

## 2024-11-12 RX ORDER — ONDANSETRON 2 MG/ML
4 INJECTION INTRAMUSCULAR; INTRAVENOUS EVERY 6 HOURS PRN
Status: DISCONTINUED | OUTPATIENT
Start: 2024-11-12 | End: 2024-11-12

## 2024-11-12 RX ORDER — LIDOCAINE HYDROCHLORIDE 10 MG/ML
INJECTION, SOLUTION EPIDURAL; INFILTRATION; INTRACAUDAL; PERINEURAL AS NEEDED
Status: DISCONTINUED | OUTPATIENT
Start: 2024-11-12 | End: 2024-11-12 | Stop reason: SURG

## 2024-11-12 RX ORDER — HYDROMORPHONE HYDROCHLORIDE 1 MG/ML
0.4 INJECTION, SOLUTION INTRAMUSCULAR; INTRAVENOUS; SUBCUTANEOUS EVERY 5 MIN PRN
Status: DISCONTINUED | OUTPATIENT
Start: 2024-11-12 | End: 2024-11-12 | Stop reason: HOSPADM

## 2024-11-12 RX ORDER — LIDOCAINE HCL/EPINEPHRINE/PF 2%-1:200K
VIAL (ML) INJECTION AS NEEDED
Status: DISCONTINUED | OUTPATIENT
Start: 2024-11-12 | End: 2024-11-12 | Stop reason: HOSPADM

## 2024-11-12 RX ORDER — LIDOCAINE HYDROCHLORIDE 40 MG/ML
SOLUTION TOPICAL AS NEEDED
Status: DISCONTINUED | OUTPATIENT
Start: 2024-11-12 | End: 2024-11-12 | Stop reason: SURG

## 2024-11-12 RX ORDER — LABETALOL HYDROCHLORIDE 5 MG/ML
5 INJECTION, SOLUTION INTRAVENOUS EVERY 5 MIN PRN
Status: DISCONTINUED | OUTPATIENT
Start: 2024-11-12 | End: 2024-11-12 | Stop reason: HOSPADM

## 2024-11-12 RX ORDER — NICOTINE POLACRILEX 4 MG
15 LOZENGE BUCCAL
Status: DISCONTINUED | OUTPATIENT
Start: 2024-11-12 | End: 2024-11-12 | Stop reason: HOSPADM

## 2024-11-12 RX ORDER — DIAZEPAM 5 MG/1
5 TABLET ORAL EVERY 6 HOURS PRN
Status: DISCONTINUED | OUTPATIENT
Start: 2024-11-12 | End: 2024-11-13

## 2024-11-12 RX ORDER — POLYETHYLENE GLYCOL 3350 17 G/17G
17 POWDER, FOR SOLUTION ORAL DAILY PRN
Status: DISCONTINUED | OUTPATIENT
Start: 2024-11-12 | End: 2024-11-13

## 2024-11-12 RX ORDER — DEXAMETHASONE SODIUM PHOSPHATE 4 MG/ML
VIAL (ML) INJECTION AS NEEDED
Status: DISCONTINUED | OUTPATIENT
Start: 2024-11-12 | End: 2024-11-12 | Stop reason: SURG

## 2024-11-12 RX ORDER — ONDANSETRON 2 MG/ML
4 INJECTION INTRAMUSCULAR; INTRAVENOUS EVERY 6 HOURS PRN
Status: DISCONTINUED | OUTPATIENT
Start: 2024-11-12 | End: 2024-11-13

## 2024-11-12 RX ORDER — LABETALOL HYDROCHLORIDE 5 MG/ML
INJECTION, SOLUTION INTRAVENOUS AS NEEDED
Status: DISCONTINUED | OUTPATIENT
Start: 2024-11-12 | End: 2024-11-12 | Stop reason: SURG

## 2024-11-12 RX ORDER — SODIUM CHLORIDE, SODIUM LACTATE, POTASSIUM CHLORIDE, CALCIUM CHLORIDE 600; 310; 30; 20 MG/100ML; MG/100ML; MG/100ML; MG/100ML
INJECTION, SOLUTION INTRAVENOUS CONTINUOUS
Status: DISCONTINUED | OUTPATIENT
Start: 2024-11-12 | End: 2024-11-13

## 2024-11-12 RX ORDER — BISACODYL 10 MG
10 SUPPOSITORY, RECTAL RECTAL
Status: DISCONTINUED | OUTPATIENT
Start: 2024-11-12 | End: 2024-11-13

## 2024-11-12 RX ORDER — ONDANSETRON 2 MG/ML
4 INJECTION INTRAMUSCULAR; INTRAVENOUS EVERY 6 HOURS PRN
Status: DISCONTINUED | OUTPATIENT
Start: 2024-11-12 | End: 2024-11-12 | Stop reason: HOSPADM

## 2024-11-12 RX ORDER — HYDROCODONE BITARTRATE AND ACETAMINOPHEN 5; 325 MG/1; MG/1
1 TABLET ORAL ONCE AS NEEDED
Status: DISCONTINUED | OUTPATIENT
Start: 2024-11-12 | End: 2024-11-12 | Stop reason: HOSPADM

## 2024-11-12 RX ORDER — LEVOTHYROXINE SODIUM 50 UG/1
50 TABLET ORAL DAILY
Status: DISCONTINUED | OUTPATIENT
Start: 2024-11-13 | End: 2024-11-13

## 2024-11-12 RX ORDER — SODIUM CHLORIDE 9 MG/ML
INJECTION, SOLUTION INTRAVENOUS CONTINUOUS
Status: DISCONTINUED | OUTPATIENT
Start: 2024-11-12 | End: 2024-11-13

## 2024-11-12 RX ORDER — CETIRIZINE HYDROCHLORIDE 10 MG/1
10 TABLET ORAL DAILY
Status: DISCONTINUED | OUTPATIENT
Start: 2024-11-13 | End: 2024-11-13

## 2024-11-12 RX ORDER — DEXTROSE MONOHYDRATE 25 G/50ML
50 INJECTION, SOLUTION INTRAVENOUS
Status: DISCONTINUED | OUTPATIENT
Start: 2024-11-12 | End: 2024-11-13

## 2024-11-12 RX ORDER — SODIUM PHOSPHATE, DIBASIC AND SODIUM PHOSPHATE, MONOBASIC 7; 19 G/230ML; G/230ML
1 ENEMA RECTAL ONCE AS NEEDED
Status: DISCONTINUED | OUTPATIENT
Start: 2024-11-12 | End: 2024-11-13

## 2024-11-12 RX ORDER — SODIUM CHLORIDE, SODIUM LACTATE, POTASSIUM CHLORIDE, CALCIUM CHLORIDE 600; 310; 30; 20 MG/100ML; MG/100ML; MG/100ML; MG/100ML
INJECTION, SOLUTION INTRAVENOUS CONTINUOUS
Status: DISCONTINUED | OUTPATIENT
Start: 2024-11-12 | End: 2024-11-12 | Stop reason: HOSPADM

## 2024-11-12 RX ORDER — NALOXONE HYDROCHLORIDE 0.4 MG/ML
80 INJECTION, SOLUTION INTRAMUSCULAR; INTRAVENOUS; SUBCUTANEOUS AS NEEDED
Status: DISCONTINUED | OUTPATIENT
Start: 2024-11-12 | End: 2024-11-12 | Stop reason: HOSPADM

## 2024-11-12 RX ORDER — SENNOSIDES 8.6 MG
17.2 TABLET ORAL NIGHTLY
Status: DISCONTINUED | OUTPATIENT
Start: 2024-11-12 | End: 2024-11-13

## 2024-11-12 RX ORDER — DEXAMETHASONE SODIUM PHOSPHATE 4 MG/ML
4 VIAL (ML) INJECTION EVERY 6 HOURS
Status: DISCONTINUED | OUTPATIENT
Start: 2024-11-12 | End: 2024-11-13

## 2024-11-12 RX ORDER — ACETAMINOPHEN 10 MG/ML
INJECTION, SOLUTION INTRAVENOUS
Status: COMPLETED
Start: 2024-11-12 | End: 2024-11-12

## 2024-11-12 RX ORDER — SODIUM CHLORIDE 9 MG/ML
INJECTION, SOLUTION INTRAVENOUS CONTINUOUS
Status: DISCONTINUED | OUTPATIENT
Start: 2024-11-12 | End: 2024-11-12 | Stop reason: HOSPADM

## 2024-11-12 RX ORDER — DEXTROSE MONOHYDRATE 25 G/50ML
50 INJECTION, SOLUTION INTRAVENOUS
Status: DISCONTINUED | OUTPATIENT
Start: 2024-11-12 | End: 2024-11-12 | Stop reason: HOSPADM

## 2024-11-12 RX ORDER — NICOTINE POLACRILEX 4 MG
15 LOZENGE BUCCAL
Status: DISCONTINUED | OUTPATIENT
Start: 2024-11-12 | End: 2024-11-13

## 2024-11-12 RX ORDER — NICOTINE POLACRILEX 4 MG
30 LOZENGE BUCCAL
Status: DISCONTINUED | OUTPATIENT
Start: 2024-11-12 | End: 2024-11-12 | Stop reason: HOSPADM

## 2024-11-12 RX ORDER — ROCURONIUM BROMIDE 10 MG/ML
INJECTION, SOLUTION INTRAVENOUS AS NEEDED
Status: DISCONTINUED | OUTPATIENT
Start: 2024-11-12 | End: 2024-11-12 | Stop reason: SURG

## 2024-11-12 RX ORDER — NALOXONE HYDROCHLORIDE 0.4 MG/ML
0.08 INJECTION, SOLUTION INTRAMUSCULAR; INTRAVENOUS; SUBCUTANEOUS
Status: DISCONTINUED | OUTPATIENT
Start: 2024-11-12 | End: 2024-11-13

## 2024-11-12 RX ORDER — NICOTINE POLACRILEX 4 MG
30 LOZENGE BUCCAL
Status: DISCONTINUED | OUTPATIENT
Start: 2024-11-12 | End: 2024-11-13

## 2024-11-12 RX ORDER — PROCHLORPERAZINE EDISYLATE 5 MG/ML
5 INJECTION INTRAMUSCULAR; INTRAVENOUS EVERY 8 HOURS PRN
Status: DISCONTINUED | OUTPATIENT
Start: 2024-11-12 | End: 2024-11-13

## 2024-11-12 RX ORDER — MIDAZOLAM HYDROCHLORIDE 1 MG/ML
1 INJECTION INTRAMUSCULAR; INTRAVENOUS EVERY 5 MIN PRN
Status: DISCONTINUED | OUTPATIENT
Start: 2024-11-12 | End: 2024-11-12 | Stop reason: HOSPADM

## 2024-11-12 RX ORDER — PROCHLORPERAZINE EDISYLATE 5 MG/ML
5 INJECTION INTRAMUSCULAR; INTRAVENOUS EVERY 8 HOURS PRN
Status: DISCONTINUED | OUTPATIENT
Start: 2024-11-12 | End: 2024-11-12 | Stop reason: HOSPADM

## 2024-11-12 RX ORDER — HYDROCODONE BITARTRATE AND ACETAMINOPHEN 5; 325 MG/1; MG/1
2 TABLET ORAL ONCE AS NEEDED
Status: DISCONTINUED | OUTPATIENT
Start: 2024-11-12 | End: 2024-11-12 | Stop reason: HOSPADM

## 2024-11-12 RX ORDER — ACETAMINOPHEN 500 MG
1000 TABLET ORAL ONCE AS NEEDED
Status: DISCONTINUED | OUTPATIENT
Start: 2024-11-12 | End: 2024-11-12 | Stop reason: HOSPADM

## 2024-11-12 RX ORDER — DOCUSATE SODIUM 100 MG/1
100 CAPSULE, LIQUID FILLED ORAL 2 TIMES DAILY
Status: DISCONTINUED | OUTPATIENT
Start: 2024-11-12 | End: 2024-11-13

## 2024-11-12 RX ORDER — HYDROMORPHONE HYDROCHLORIDE 1 MG/ML
0.6 INJECTION, SOLUTION INTRAMUSCULAR; INTRAVENOUS; SUBCUTANEOUS EVERY 5 MIN PRN
Status: DISCONTINUED | OUTPATIENT
Start: 2024-11-12 | End: 2024-11-12 | Stop reason: HOSPADM

## 2024-11-12 RX ORDER — DIPHENHYDRAMINE HYDROCHLORIDE 50 MG/ML
25 INJECTION INTRAMUSCULAR; INTRAVENOUS EVERY 4 HOURS PRN
Status: DISCONTINUED | OUTPATIENT
Start: 2024-11-12 | End: 2024-11-13

## 2024-11-12 RX ORDER — METOCLOPRAMIDE HYDROCHLORIDE 5 MG/ML
INJECTION INTRAMUSCULAR; INTRAVENOUS AS NEEDED
Status: DISCONTINUED | OUTPATIENT
Start: 2024-11-12 | End: 2024-11-12 | Stop reason: SURG

## 2024-11-12 RX ORDER — MEPERIDINE HYDROCHLORIDE 25 MG/ML
12.5 INJECTION INTRAMUSCULAR; INTRAVENOUS; SUBCUTANEOUS AS NEEDED
Status: DISCONTINUED | OUTPATIENT
Start: 2024-11-12 | End: 2024-11-12 | Stop reason: HOSPADM

## 2024-11-12 RX ORDER — METHOCARBAMOL 100 MG/ML
1000 INJECTION, SOLUTION INTRAMUSCULAR; INTRAVENOUS ONCE
Status: COMPLETED | OUTPATIENT
Start: 2024-11-12 | End: 2024-11-12

## 2024-11-12 RX ORDER — ACETAMINOPHEN 500 MG
1000 TABLET ORAL ONCE
Status: DISCONTINUED | OUTPATIENT
Start: 2024-11-12 | End: 2024-11-12 | Stop reason: HOSPADM

## 2024-11-12 RX ORDER — SPIRONOLACTONE 25 MG/1
100 TABLET ORAL DAILY
Status: DISCONTINUED | OUTPATIENT
Start: 2024-11-13 | End: 2024-11-13

## 2024-11-12 RX ORDER — HYDROMORPHONE HYDROCHLORIDE 1 MG/ML
0.2 INJECTION, SOLUTION INTRAMUSCULAR; INTRAVENOUS; SUBCUTANEOUS EVERY 5 MIN PRN
Status: DISCONTINUED | OUTPATIENT
Start: 2024-11-12 | End: 2024-11-12 | Stop reason: HOSPADM

## 2024-11-12 RX ORDER — METHOCARBAMOL 750 MG/1
750 TABLET, FILM COATED ORAL EVERY 6 HOURS PRN
Status: DISCONTINUED | OUTPATIENT
Start: 2024-11-12 | End: 2024-11-13

## 2024-11-12 RX ORDER — DIPHENHYDRAMINE HCL 25 MG
25 CAPSULE ORAL EVERY 4 HOURS PRN
Status: DISCONTINUED | OUTPATIENT
Start: 2024-11-12 | End: 2024-11-13

## 2024-11-12 RX ORDER — KETOROLAC TROMETHAMINE 30 MG/ML
30 INJECTION, SOLUTION INTRAMUSCULAR; INTRAVENOUS EVERY 6 HOURS PRN
Status: DISCONTINUED | OUTPATIENT
Start: 2024-11-12 | End: 2024-11-13

## 2024-11-12 RX ADMIN — DEXAMETHASONE SODIUM PHOSPHATE 4 MG: 4 MG/ML VIAL (ML) INJECTION at 15:52:00

## 2024-11-12 RX ADMIN — MIDAZOLAM HYDROCHLORIDE 2 MG: 1 INJECTION INTRAMUSCULAR; INTRAVENOUS at 15:30:00

## 2024-11-12 RX ADMIN — METOCLOPRAMIDE HYDROCHLORIDE 10 MG: 5 INJECTION INTRAMUSCULAR; INTRAVENOUS at 15:52:00

## 2024-11-12 RX ADMIN — LABETALOL HYDROCHLORIDE 5 MG: 5 INJECTION, SOLUTION INTRAVENOUS at 16:10:00

## 2024-11-12 RX ADMIN — LIDOCAINE HYDROCHLORIDE 50 MG: 10 INJECTION, SOLUTION EPIDURAL; INFILTRATION; INTRACAUDAL; PERINEURAL at 15:35:00

## 2024-11-12 RX ADMIN — ROCURONIUM BROMIDE 50 MG: 10 INJECTION, SOLUTION INTRAVENOUS at 16:05:00

## 2024-11-12 RX ADMIN — ROCURONIUM BROMIDE 20 MG: 10 INJECTION, SOLUTION INTRAVENOUS at 17:18:00

## 2024-11-12 RX ADMIN — SODIUM CHLORIDE, SODIUM LACTATE, POTASSIUM CHLORIDE, CALCIUM CHLORIDE: 600; 310; 30; 20 INJECTION, SOLUTION INTRAVENOUS at 15:29:00

## 2024-11-12 RX ADMIN — SODIUM CHLORIDE, SODIUM LACTATE, POTASSIUM CHLORIDE, CALCIUM CHLORIDE: 600; 310; 30; 20 INJECTION, SOLUTION INTRAVENOUS at 18:05:00

## 2024-11-12 RX ADMIN — LIDOCAINE HYDROCHLORIDE 4 ML: 40 SOLUTION TOPICAL at 15:36:00

## 2024-11-12 NOTE — ANESTHESIA PREPROCEDURE EVALUATION
PRE-OP EVALUATION    Patient Name: Madison Villegas    Admit Diagnosis: Cervical radiculopathy [M54.12]    Pre-op Diagnosis: Cervical radiculopathy [M54.12]    right anterior cervical 5- cervical 6, cervical 6-cervical 7 discectomy and arthroplasty    Anesthesia Procedure: right anterior cervical 5- cervical 6, cervical 6-cervical 7 discectomy and arthroplasty (Right: Spine Cervical)  INTRAOPERATIVE NEURO MONITORING (Right)    Surgeons and Role:     * ITA Cid, DO - Primary    Pre-op vitals reviewed.  Temp: 96.8 °F (36 °C)  Pulse: 77  Resp: 16  BP: 140/96  SpO2: 100 %  Body mass index is 38.64 kg/m².    Current medications reviewed.  Hospital Medications:   [Transfer Hold] acetaminophen (Tylenol Extra Strength) tab 1,000 mg  1,000 mg Oral Once    [Transfer Hold] glucose (Dex4) 15 GM/59ML oral liquid 15 g  15 g Oral Q15 Min PRN    Or    [Transfer Hold] glucose (Glutose) 40% oral gel 15 g  15 g Oral Q15 Min PRN    Or    [Transfer Hold] glucose-vitamin C (Dex-4) chewable tab 4 tablet  4 tablet Oral Q15 Min PRN    Or    [Transfer Hold] dextrose 50% injection 50 mL  50 mL Intravenous Q15 Min PRN    Or    [Transfer Hold] glucose (Dex4) 15 GM/59ML oral liquid 30 g  30 g Oral Q15 Min PRN    Or    [Transfer Hold] glucose (Glutose) 40% oral gel 30 g  30 g Oral Q15 Min PRN    Or    [Transfer Hold] glucose-vitamin C (Dex-4) chewable tab 8 tablet  8 tablet Oral Q15 Min PRN    lactated ringers infusion   Intravenous Continuous    ceFAZolin (Ancef) 2g in 10mL IV syringe premix  2 g Intravenous Once       Outpatient Medications:   Prescriptions Prior to Admission[1]    Allergies: Alcohol and Doxycycline      Anesthesia Evaluation    Patient summary reviewed.    Anesthetic Complications  (-) history of anesthetic complications         GI/Hepatic/Renal      (-) GERD                           Cardiovascular        Exercise tolerance: good     MET: >4    (+) obesity  (-) hypertension     (-) CAD                  (-) angina      (-) EMANUEL         Endo/Other      (-) diabetes     (+) hypothyroidism                       Pulmonary      (-) asthma  (-) COPD       (-) shortness of breath     (-) sleep apnea       Neuro/Psych      (+) depression  (+) anxiety                      Patient Active Problem List:     Hypothyroidism     Hashimoto's thyroiditis     Vitamin D deficiency     Right thyroid nodule     Anxiety     Chronic pain of left knee     Primary osteoarthritis of left knee     Chronic bilateral low back pain without sciatica     Neck pain     Subcutaneous mass of left thumb            Past Surgical History:   Procedure Laterality Date    Other surgical history Left 9/2007    knee arthroscopy meniscus     Social History     Socioeconomic History    Marital status:    Tobacco Use    Smoking status: Never    Smokeless tobacco: Never   Vaping Use    Vaping status: Never Used   Substance and Sexual Activity    Alcohol use: No    Drug use: Never     History   Drug Use Unknown     Available pre-op labs reviewed.  Lab Results   Component Value Date    WBC 10.0 10/29/2024    RBC 4.52 10/29/2024    HGB 13.0 10/29/2024    HCT 38.7 10/29/2024    MCV 85.6 10/29/2024    MCH 28.8 10/29/2024    MCHC 33.6 10/29/2024    RDW 13.0 10/29/2024    .0 10/29/2024     Lab Results   Component Value Date     10/29/2024    K 3.7 10/29/2024     10/29/2024    CO2 22.0 10/29/2024    BUN 10 10/29/2024    CREATSERUM 0.72 10/29/2024     (H) 10/29/2024    CA 10.0 10/29/2024     Lab Results   Component Value Date    INR 0.97 10/29/2024         Airway      Mallampati: III  Mouth opening: <3 FB  TM distance: 4 - 6 cm  Neck ROM: limited Cardiovascular    Cardiovascular exam normal.  Rhythm: regular  Rate: normal     Dental  Comment: Patient denies any loose/missing/cracked teeth. No gross abnormalities or loose teeth noted on exam.      Dentition appears grossly intact         Pulmonary    Pulmonary exam normal.                 Other  findings              ASA: 2   Plan: general  NPO status verified and patient meets guidelines.    Post-procedure pain management plan discussed with surgeon and patient.    Comment:     A detailed discussion about the anesthetic plan was held with Madison Villegas in the preoperative area. Discussed benefits and risks of general anesthesia including, reasonable expectations of post-operative pain, nausea, vomiting, injury to lips, gums, tongue, teeth, eyes, awareness under anesthesia, cardiac, pulmonary, aspiration, stroke, and death. All questions were answered appropriately and patient demonstrated understanding of realistic expectations and risks of undergoing anesthesia. Madison Villegas consents to receiving anesthesia and wishes to proceed.      Plan/risks discussed with: patient                Present on Admission:  **None**             [1]   Medications Prior to Admission   Medication Sig Dispense Refill Last Dose/Taking    melatonin 5 MG Oral Cap Take 1 capsule (5 mg total) by mouth nightly.   11/11/2024 at  9:00 PM    SERTRALINE 50 MG Oral Tab TAKE 1 TABLET DAILY 90 tablet 1 11/12/2024 at  9:00 AM    metFORMIN  MG Oral Tablet 24 Hr Take 1 tablet (500 mg total) by mouth daily with breakfast.   11/11/2024 at  8:00 AM    Continuous Blood Gluc Sensor (DEXCOM G6 SENSOR) Does not apply Misc 1 Device Every 10 days. CHANGE SENSOR EVERY 10 DAYS   Taking    Continuous Blood Gluc Transmit (DEXCOM G6 TRANSMITTER) Does not apply Misc 1 Device every 3 (three) months.   Taking    spironolactone 100 MG Oral Tab Take 1 tablet (100 mg total) by mouth daily.   11/12/2024 at  9:00 AM    NEXPLANON 68 MG Subcutaneous Implant    Taking    levothyroxine (SYNTHROID) 50 MCG Oral Tab Take 1 tablet (50 mcg total) by mouth daily. 30 tablet 0 11/12/2024 at  9:00 AM    Glucose Blood (ONETOUCH VERIO) In Vitro Strip USE 2 STRIPS TWICE DAILY       loratadine (CLARITIN) 10 MG Oral Tab Take 1 tablet (10 mg total) by mouth  nightly as needed.   More than a month

## 2024-11-12 NOTE — ANESTHESIA PROCEDURE NOTES
Peripheral IV  Date/Time: 11/12/2024 4:41 PM  Inserted by: Ciro Reeves MD    Placement  Needle size: 20 G  Laterality: left  Location: hand  Site prep: alcohol  Technique: anatomical landmarks  Attempts: 1

## 2024-11-12 NOTE — BRIEF OP NOTE
Pre-Operative Diagnosis: Cervical radiculopathy [M54.12]     Post-Operative Diagnosis: Cervical radiculopathy [M54.12]      Procedure Performed:   right anterior cervical 5- cervical 6, cervical 6-cervical 7 discectomy and arthroplasty    Surgeons and Role:     * ITA Cid DO - Primary    Assistant(s):  Surgical Assistant.: Addy Segovia, RSA     Surgical Findings: severe stenosis due to herniated discs     Specimen: none     Estimated Blood Loss: Blood Output: 10 mL (11/12/2024  5:51 PM)      Dictation Number:  to follow    Mary Cid DO  11/12/2024  5:58 PM

## 2024-11-12 NOTE — ANESTHESIA PROCEDURE NOTES
Airway  Date/Time: 11/12/2024 3:36 PM  Urgency: elective    Airway not difficult    General Information and Staff    Patient location during procedure: OR  Anesthesiologist: Ciro Reeves MD  Performed: anesthesiologist   Performed by: Ciro Reeves MD  Authorized by: Ciro Reeves MD      Indications and Patient Condition  Indications for airway management: anesthesia  Spontaneous Ventilation: absent  Sedation level: deep  Preoxygenated: yes  Patient position: sniffing  Mask difficulty assessment: 0 - not attempted    Final Airway Details  Final airway type: endotracheal airway      Successful airway: ETT  Cuffed: yes   Successful intubation technique: Video laryngoscopy  Facilitating devices/methods: intubating stylet  Endotracheal tube insertion site: oral  Blade: GlideScope  Blade size: #4  ETT size (mm): 7.0    Cormack-Lehane Classification: grade I - full view of glottis  Placement verified by: capnometry   Measured from: lips  ETT to lips (cm): 23  Number of attempts at approach: 1  Number of other approaches attempted: 0    Additional Comments    Teeth intact post-procedure.    Neck maintained in neutral position throughout airway management.

## 2024-11-13 ENCOUNTER — APPOINTMENT (OUTPATIENT)
Dept: GENERAL RADIOLOGY | Facility: HOSPITAL | Age: 39
End: 2024-11-13
Attending: NEUROLOGICAL SURGERY
Payer: COMMERCIAL

## 2024-11-13 VITALS
DIASTOLIC BLOOD PRESSURE: 72 MMHG | OXYGEN SATURATION: 95 % | RESPIRATION RATE: 19 BRPM | HEART RATE: 84 BPM | SYSTOLIC BLOOD PRESSURE: 126 MMHG | WEIGHT: 239.44 LBS | TEMPERATURE: 98 F | HEIGHT: 66 IN | BODY MASS INDEX: 38.48 KG/M2

## 2024-11-13 PROBLEM — M54.12 CERVICAL RADICULOPATHY: Status: ACTIVE | Noted: 2024-11-13

## 2024-11-13 PROBLEM — E11.9 DIABETES MELLITUS TYPE 2, NONINSULIN DEPENDENT (HCC): Status: ACTIVE | Noted: 2024-11-13

## 2024-11-13 PROBLEM — I10 PRIMARY HYPERTENSION: Status: ACTIVE | Noted: 2024-11-13

## 2024-11-13 PROBLEM — F32.A ANXIETY AND DEPRESSION: Status: ACTIVE | Noted: 2024-11-13

## 2024-11-13 PROBLEM — F41.9 ANXIETY AND DEPRESSION: Status: ACTIVE | Noted: 2024-11-13

## 2024-11-13 LAB
ANION GAP SERPL CALC-SCNC: 6 MMOL/L (ref 0–18)
BUN BLD-MCNC: 8 MG/DL (ref 9–23)
CALCIUM BLD-MCNC: 9.9 MG/DL (ref 8.7–10.4)
CHLORIDE SERPL-SCNC: 107 MMOL/L (ref 98–112)
CO2 SERPL-SCNC: 25 MMOL/L (ref 21–32)
CREAT BLD-MCNC: 0.79 MG/DL
EGFRCR SERPLBLD CKD-EPI 2021: 96 ML/MIN/1.73M2 (ref 60–?)
EST. AVERAGE GLUCOSE BLD GHB EST-MCNC: 163 MG/DL (ref 68–126)
GLUCOSE BLD-MCNC: 234 MG/DL (ref 70–99)
GLUCOSE BLD-MCNC: 241 MG/DL (ref 70–99)
GLUCOSE BLD-MCNC: 267 MG/DL (ref 70–99)
HBA1C MFR BLD: 7.3 % (ref ?–5.7)
HCT VFR BLD AUTO: 39.2 %
HGB BLD-MCNC: 13.1 G/DL
OSMOLALITY SERPL CALC.SUM OF ELEC: 294 MOSM/KG (ref 275–295)
POTASSIUM SERPL-SCNC: 4.3 MMOL/L (ref 3.5–5.1)
SODIUM SERPL-SCNC: 138 MMOL/L (ref 136–145)

## 2024-11-13 PROCEDURE — 99213 OFFICE O/P EST LOW 20 MIN: CPT | Performed by: HOSPITALIST

## 2024-11-13 PROCEDURE — 72040 X-RAY EXAM NECK SPINE 2-3 VW: CPT | Performed by: NEUROLOGICAL SURGERY

## 2024-11-13 RX ORDER — METHYLPREDNISOLONE 4 MG/1
TABLET ORAL
Qty: 21 TABLET | Refills: 0 | Status: SHIPPED | OUTPATIENT
Start: 2024-11-13

## 2024-11-13 RX ORDER — HYDROCODONE BITARTRATE AND ACETAMINOPHEN 5; 325 MG/1; MG/1
1 TABLET ORAL EVERY 4 HOURS PRN
Status: DISCONTINUED | OUTPATIENT
Start: 2024-11-13 | End: 2024-11-13

## 2024-11-13 RX ORDER — SODIUM CHLORIDE 9 MG/ML
INJECTION, SOLUTION INTRAVENOUS CONTINUOUS
Status: DISCONTINUED | OUTPATIENT
Start: 2024-11-13 | End: 2024-11-13

## 2024-11-13 RX ORDER — HYDROCODONE BITARTRATE AND ACETAMINOPHEN 5; 325 MG/1; MG/1
1 TABLET ORAL EVERY 6 HOURS PRN
Qty: 30 TABLET | Refills: 0 | Status: SHIPPED | OUTPATIENT
Start: 2024-11-13

## 2024-11-13 RX ORDER — HYDROCODONE BITARTRATE AND ACETAMINOPHEN 5; 325 MG/1; MG/1
2 TABLET ORAL EVERY 4 HOURS PRN
Status: DISCONTINUED | OUTPATIENT
Start: 2024-11-13 | End: 2024-11-13

## 2024-11-13 RX ORDER — METHOCARBAMOL 750 MG/1
750 TABLET, FILM COATED ORAL EVERY 6 HOURS PRN
Qty: 45 TABLET | Refills: 0 | Status: SHIPPED | OUTPATIENT
Start: 2024-11-13

## 2024-11-13 NOTE — PLAN OF CARE
Patient is alert and oriented x 4. Vitals stable on room air. Patient reports mild pain managed by pca pump. Denies numbness & tingling. Dressing to neck c/d/I. Tobar in place. Tolerating regular diet. Plan for PT/OT.  Plan of care discussed with patient.

## 2024-11-13 NOTE — DISCHARGE SUMMARY
[facility]  Discharge Summary    Madison Villegas Patient Status:  Outpatient in a Bed    1985 MRN [id number]   Location [facility] 3SW-A Attending ITA Cid, DO   Hosp Day # 0 PCP Warren Powell MD     Date of Admission: 2024    Date of Discharge: 24.    Admitting Diagnosis: Cervical radiculopathy [M54.12]    Discharge Diagnosis: Cervical radiculopathy   Patient Active Problem List   Diagnosis    Hypothyroidism    Hashimoto's thyroiditis    Vitamin D deficiency    Right thyroid nodule    Anxiety    Chronic pain of left knee    Primary osteoarthritis of left knee    Chronic bilateral low back pain without sciatica    Neck pain    Subcutaneous mass of left thumb    Diabetes mellitus type 2, noninsulin dependent (HCC)    Primary hypertension    Anxiety and depression    Cervical radiculopathy    Cervical radiculopathy [M54.12]    Reason for Admission:  Cervical radiculopathy s/p R C5-7 DR    Procedures: right anterior cervical 5- cervical 6, cervical 6-cervical 7 discectomy and arthroplasty     Hospital Course: Madisno Villegas is a(n) 39 year old female presenting with C6 radiculopathy found to have HNP with stenosis at C5-6 and C6-7. She was admitted to [facility] on  and underwent C5-7 ADR without complication. Patient was admitted to the OrthoSpine unit overnight for close monitoring. Her manzanares catheter was removed and she was able to void. Her pain was well controlled. She was transitioned from a PCA to oral pain medications. On POD#1 she experienced brief dizziness with ambulation which resolved. She reported resolution of her pre-op arm pain. She was able to ambulate without difficulty. She worked with PT/OT who recommended no further needs. She was deemed stable for discharge home on .    Complications: None    Discharge Condition: Stable    Disposition: Final discharge disposition not confirmed    Discharge Medications:   Current Discharge Medication List         START taking these medications    Details   methylPREDNISolone 4 MG Oral Tablet Therapy Pack Take as directed on dose pack instructions  Qty: 21 tablet, Refills: 0    Associated Diagnoses: Cervical radiculopathy      HYDROcodone-acetaminophen 5-325 MG Oral Tab Take 1 tablet by mouth every 6 (six) hours as needed for Pain.  Qty: 30 tablet, Refills: 0    Associated Diagnoses: Cervical radiculopathy      methocarbamol 750 MG Oral Tab Take 1 tablet (750 mg total) by mouth every 6 (six) hours as needed (muscle spasms).  Qty: 45 tablet, Refills: 0    Associated Diagnoses: Cervical radiculopathy           CONTINUE these medications which have NOT CHANGED    Details   melatonin 5 MG Oral Cap Take 1 capsule (5 mg total) by mouth nightly.      SERTRALINE 50 MG Oral Tab TAKE 1 TABLET DAILY  Qty: 90 tablet, Refills: 1    Associated Diagnoses: Anxiety      metFORMIN  MG Oral Tablet 24 Hr Take 1 tablet (500 mg total) by mouth daily with breakfast.      Continuous Blood Gluc Sensor (DEXCOM G6 SENSOR) Does not apply Misc 1 Device Every 10 days. CHANGE SENSOR EVERY 10 DAYS      Continuous Blood Gluc Transmit (DEXCOM G6 TRANSMITTER) Does not apply Misc 1 Device every 3 (three) months.      NEXPLANON 68 MG Subcutaneous Implant       levothyroxine (SYNTHROID) 50 MCG Oral Tab Take 1 tablet (50 mcg total) by mouth daily.  Qty: 30 tablet, Refills: 0      Glucose Blood (ONETOUCH VERIO) In Vitro Strip USE 2 STRIPS TWICE DAILY      loratadine (CLARITIN) 10 MG Oral Tab Take 1 tablet (10 mg total) by mouth nightly as needed.           STOP taking these medications       spironolactone 100 MG Oral Tab              Follow up Visits:   Future Appointments   Date Time Provider Department Center   11/18/2024  9:00 AM Tatiana Kurtz APRN [facility]   12/9/2024 11:00 AM ITA Cid DO [facility]         Patient Instructions:  Activity: no driving while on analgesics No heavy lifting, bending, twisting    Wound Care: keep wound clean and dry   Ok to shower today and remove dressing after showering. Steri strips to be removed at post-op appt next week.     Jayne Guidry PA-C  [facility]  11/13/2024 10:41 AM

## 2024-11-13 NOTE — OCCUPATIONAL THERAPY NOTE
OCCUPATIONAL THERAPY EVALUATION - INPATIENT    Room Number: 382/382-A  Evaluation Date: 11/13/2024     Type of Evaluation: Initial  Presenting Problem: s/p C5-C7 ADR 11/12/24    Physician Order: IP Consult to Occupational Therapy  Reason for Therapy:  ADL/IADL Dysfunction and Discharge Planning    OCCUPATIONAL THERAPY ASSESSMENT   Patient is a 39 year old female admitted on 11/12/2024 with Presenting Problem: s/p C5-C7 ADR 11/12/24. Co-Morbidities : DM, anxiety, depression  Patient is currently functioning near baseline with ADLs and functional transfers.  Prior to admission, patient's baseline is independent.  Patient met all OT goals at Mod I level for increased time.  Patient reports no further questions/concerns at this time.     No further skilled OT needs at this time.    Recommendations for nursing staff:   Transfers: 1-person  Toileting location: Toilet    EVALUATION SESSION:  Patient at start of session: supine    FUNCTIONAL TRANSFER ASSESSMENT  Sit to Stand: Edge of Bed; Chair  Edge of Bed: Modified Independent  Chair: Modified Independent  Toilet Transfer: Modified Independent (standard height, light use of grab bar, reports vanity adjacent at home)    BED MOBILITY  Rolling: Modified Independent  Supine to Sit : Modified Independent (via logroll)    BALANCE ASSESSMENT     FUNCTIONAL ADL ASSESSMENT  Eating: Modified Independent  Grooming Standing: Modified Independent  UB Dressing Seated: Modified Independent  LB Dressing Seated: Modified Independent (without AE via elevated cross-leg method)  LB Dressing Standing: Modified Independent  Toileting Seated: Modified Independent  Toileting Standing: Modified Independent    ACTIVITY TOLERANCE: WFL           BP: 139/69  BP Location: Left arm  BP Method: Automatic  Patient Position: Sitting    O2 SATURATIONS  Oxygen Therapy  SPO2% on Room Air at Rest: 96    COGNITION  Overall Cognitive Status:  WFL - within functional limits    COGNITION ASSESSMENTS     Upper  Extremity:   ROM: within functional limits   Strength: is within functional limits     EDUCATION PROVIDED  Patient Education : Role of Occupational Therapy; Fall Prevention; Functional Transfer Techniques; Surgical Precautions; Posture/Positioning; Proper Body Mechanics  Patient's Response to Education: Verbalized Understanding    Equipment used: n/a    Therapist comments: Patient motivated and participatory. Educated on disc replacement/spine precautions and incorporation into ADLs and transfers, followed with good return demo. Patient performed all ADLs and functional transfers safely and independently. Patient aware of recommendation for initial supervision at discharge, including supervision for shower transfers and increased assist with IADLs; patient reports will have initial supervision/assist as needed from  initially at discharge, states mother also lives 5 minutes away and has supportive neighbors able to assist when  returns to work. Patient reports no further questions or concerns regarding discharge home to ADLs.     Patient End of Session: Up in chair;With  staff;Needs met;Call light within reach;RN aware of session/findings;All patient questions and concerns addressed    OCCUPATIONAL PROFILE    HOME SITUATION  Type of Home: House  Home Layout: Multi-level  Lives With: Spouse;Family (15yo and 9yo daughters)    Toilet and Equipment: Standard height toilet  Shower/Tub and Equipment: Walk-in shower;Tub-shower combo;Shower chair  Other Equipment: None    Occupation/Status: med surg RN     Drives: Yes       Prior Level of Function: Patient reports independent in all I/ADL and functional mobility without device prior to admission. Reports hasn't been able to work since July, but is looking forward to getting back to it.    SUBJECTIVE  \"My right arm is feeling much better\"    PAIN ASSESSMENT  Ratin  Location: neck  Management Techniques: Body mechanics;Repositioning;Nurse  notified    OBJECTIVE  Precautions: Spine  Fall Risk: Standard fall risk    WEIGHT BEARING RESTRICTION       AM-PAC ‘6-Clicks’ Inpatient Daily Activity Short Form  -   Putting on and taking off regular lower body clothing?: None  -   Bathing (including washing, rinsing, drying)?: None  -   Toileting, which includes using toilet, bedpan or urinal? : None  -   Putting on and taking off regular upper body clothing?: None  -   Taking care of personal grooming such as brushing teeth?: None  -   Eating meals?: None    AM-PAC Score:  Score: 24  Approx Degree of Impairment: 0%  Standardized Score (AM-PAC Scale): 57.54    ADDITIONAL TESTS     NEUROLOGICAL FINDINGS      PLAN   Patient has been evaluated and presents with no skilled Occupational Therapy needs at this time.  Patient discharged from Occupational Therapy services.  Please re-order if a new functional limitation presents during this admission.    OT Device Recommendations: None    Patient Evaluation Complexity Level:   Occupational Profile/Medical History LOW - Brief history including review of medical or therapy records    Specific performance deficits impacting engagement in ADL/IADL LOW  1 - 3 performance deficits    Client Assessment/Performance Deficits LOW - No comorbidities nor modifications of tasks    Clinical Decision Making LOW - Analysis of occupational profile, problem-focused assessments, limited treatment options    Overall Complexity LOW     OT Session Time: 30 minutes  Self-Care Home Management: 10 minutes

## 2024-11-13 NOTE — DISCHARGE INSTRUCTIONS
Cervical Disc Replacement: Home Care Instructions     Activity Restrictions  No twisting, pulling, pushing or lifting >5 lbs.  No lifting anything over your head  You may sleep on your back or on your side. Avoid sleeping on your stomach.  Maintain a neutral cervical spine, i.e. avoid elevating head on multiple pillows    Soft Collar  Collar can be worn as needed, for comfort.  You do not need to wear collar while sleeping.    Incision  You have dissolvable stitches under the skin, and steri strips over your incision. Do not remove steri strips. They will be removed at your 1-week post op visit.  You may shower 2 days after surgery.  You may leave your incision uncovered for showering.  Avoid direct water pressure to wound, may allow water to run over incision.  Do not rub or scrub incision. Pat to dry.  Do not apply lotions, serums, or ointments to incision.  You may apply a covered ice pack to your incision for 10 minutes at a time, multiple times a day, as needed for comfort.     Swallowing  It is not uncommon to experience throat soreness or mild difficulty swallowing after surgery. This is due to swelling of the muscles and tissues in the area.  Taking small bites of food followed by small sips of liquids can help.  You may drink hot or cold liquids, to your preference.  If you experience any choking, coughing, or difficulty getting food or liquids down, please notify your surgeon's office immediately.     Walking  Walk several times a day, smaller distances are better.  Your goal is to increase the distance you walk each day.  Remember to listen to your body!    Physical Therapy  You will be cleared to start PT at your 1-month visit.    Follow-Up  You will be seen in clinic by the Nurse Practitioner 1 week after surgery.  You will be seen in clinic by Dr. Cid 1 month after surgery.     When to Follow Up or Go to ER  Monitor incision for any redness, swelling, warmth, drainage, or separation. Call our office  as soon as possible if you notice any of these.  If you develop any chest pain, shortness of breath, fever/chills, or swelling/redness/pain/warmth to the legs or calves, notify our office and/or go tot he ER.

## 2024-11-13 NOTE — PHYSICAL THERAPY NOTE
PHYSICAL THERAPY EVALUATION - INPATIENT     Room Number: 382/382-A  Evaluation Date: 2024  Type of Evaluation: Initial  Physician Order: PT Eval and Treat    Presenting Problem: s/p C5-7 ADR 24  Co-Morbidities : DM, anxiety, depression  Reason for Therapy: Mobility Dysfunction and Discharge Planning    PHYSICAL THERAPY ASSESSMENT   Patient is a 39 year old female admitted 2024 for C5-7 ACDF.   Patient is currently functioning near baseline with bed mobility, transfers, gait, and stair negotiation. Prior to admission, patient's baseline is independent.     PLAN  Patient has been evaluated and presents with no skilled Physical Therapy needs at this time.  Patient discharged from Physical Therapy services.  Please re-order if a new functional limitation presents during this admission.         GOALS  Patient was able to achieve the following goals ...    Patient was able to transfer Safely and independently   Patient able to ambulate on level surfaces Safely and independently     HOME SITUATION  Type of Home: House  Home Layout: Multi-level                     Lives With: Family;Spouse    Drives: Yes         Prior Level of [place]: Pt lives with spouse and children (14 and 10) in multi level home. Pt independent with ADL and mobility. Pt does not use RW or cane. Pt works as a med/surg RN.     SUBJECTIVE  \"I feel like a new woman.\"     OBJECTIVE  Precautions: Spine (soft collar for comfort)  Fall Risk: Standard fall risk    WEIGHT BEARING RESTRICTION     PAIN ASSESSMENT  Ratin  Location: neck  Management Techniques: Activity promotion;Repositioning    COGNITION  Overall Cognitive Status:  WFL - within functional limits    RANGE OF MOTION AND STRENGTH ASSESSMENT  Upper extremity ROM and strength are within functional limits     Lower extremity ROM is within functional limits     Lower extremity strength is within functional limits     BALANCE  Static Sitting: Good  Dynamic Sitting: Good  Static  Standing: Good  Dynamic Standing: Good    ADDITIONAL TESTS                                    ACTIVITY TOLERANCE                         O2 WALK       NEUROLOGICAL FINDINGS                        AM-PAC '6-Clicks' INPATIENT SHORT FORM - BASIC MOBILITY  How much difficulty does the patient currently have...  Patient Difficulty: Turning over in bed (including adjusting bedclothes, sheets and blankets)?: None   Patient Difficulty: Sitting down on and standing up from a chair with arms (e.g., wheelchair, bedside commode, etc.): None   Patient Difficulty: Moving from lying on back to sitting on the side of the bed?: None   How much help from another person does the patient currently need...   Help from Another: Moving to and from a bed to a chair (including a wheelchair)?: None   Help from Another: Need to walk in hospital room?: None   Help from Another: Climbing 3-5 steps with a railing?: None       AM-PAC Score:  Raw Score: 24   Approx Degree of Impairment: 0%   Standardized Score (AM-PAC Scale): 61.14   CMS Modifier (G-Code): CH    FUNCTIONAL ABILITY STATUS  Gait Assessment   Functional Mobility/Gait Assessment  Gait Assistance: Modified independent  Distance (ft): 400  Assistive Device: None  Stairs: Stairs  How Many Stairs: 4  Assist: Modified independent    Skilled Therapy Provided   MOD I for bed mobility using log roll.   Sit-stand with MOD I.   Ambulatory with MOD I.   Ambulates with steady step through gait pattern and good khanh.   No LOB.   Ascended/descended 4 stairs safely with MOD I.   VC for sequencing.   Returned to room sitting EOB and supine in bed with MOD I.     Bed Mobility:  Rolling: MOD I  Supine to sit: MOD I   Sit to supine: MOD I     Transfer Mobility:  Sit to stand: MOD I   Stand to sit: MOD I  Gait = MOD I    Therapist's comments: Reviewed activity recommendations including spine precautions and log roll.     Exercise/Education Provided:  Bed mobility  Energy conservation  Functional  activity tolerated  Gait training  Posture  Strengthening  Transfer training    Patient End of Session: In bed;Needs met;Call light within reach;RN aware of session/findings;All patient questions and concerns addressed;Hospital anti-slip socks    Patient Evaluation Complexity Level:  History Moderate - 1 or 2 personal factors and/or co-morbidities   Examination of body systems Low -  addressing 1-2 elements   Clinical Presentation Low- Stable   Clinical Decision Making Low Complexity       PT Session Time: 20 minutes  Gait Training: 10 minutes  Therapeutic Activity:  minutes  Neuromuscular Re-education:  minutes  Therapeutic Exercise:  minutes

## 2024-11-13 NOTE — PROGRESS NOTES
[facility]   part of [facility]     Hospitalist Progress Note     Madison Villegas Patient Status:  Outpatient in a Bed    1985 MRN [id number]   Location [facility] 3SW-A Attending ITA Cid, DO   Hosp Day # 0 PCP Warren Powell MD     Chief Complaint: Cervical radiculopathy     Subjective:   Patient states RUE pain markedly improved. Nauseated. Dizzy when up with therapy. On room air.     Current medications:   sennosides  17.2 mg Oral Nightly    docusate sodium  100 mg Oral BID    dexamethasone  4 mg Intravenous Q6H    insulin aspart  1-10 Units Subcutaneous TID AC and HS    levothyroxine  50 mcg Oral Daily    cetirizine  10 mg Oral Daily    melatonin  5 mg Oral Nightly    sertraline  50 mg Oral Daily    [Held by provider] spironolactone  100 mg Oral Daily       Objective:    Review of Systems:   10 point ROS completed and was negative, except for pertinent positive and negatives stated in subjective.    Vital signs:  Temp:  [97.6 °F (36.4 °C)-99.2 °F (37.3 °C)] 98.4 °F (36.9 °C)  Pulse:  [77-91] 90  Resp:  [12-21] 18  BP: (116-144)/(65-88) 139/69  SpO2:  [95 %-100 %] 96 %  Patient Weight for the past 72 hrs:   Weight   24 1057 239 lb 6.7 oz (108.6 kg)     Physical Exam:    General: No acute distress.   Respiratory: Clear to auscultation bilaterally.  Cardiovascular: S1, S2. Regular rate and rhythm.  Abdomen: Soft, nontender, nondistended.  Positive bowel sounds.   Extremities: No edema.  Neuro: AAOx3    Diagnostic Data:    Labs:  Recent Labs   Lab 24  0511   HGB 13.1       Recent Labs   Lab 24  0511   *   BUN 8*   CREATSERUM 0.79   CA 9.9      K 4.3      CO2 25.0       Estimated Creatinine Clearance: 87.7 mL/min (based on SCr of 0.79 mg/dL).    No results for input(s): \"PTP\", \"INR\" in the last 168 hours.         COVID-19 Lab Results    COVID-19  Lab Results   Component Value Date    COVID19 Detected (A) 2020       Pro-Calcitonin  No  results for input(s): \"PCT\" in the last 168 hours.    Cardiac  No results for input(s): \"TROP\", \"PBNP\" in the last 168 hours.    Creatinine Kinase  No results for input(s): \"CK\" in the last 168 hours.    Inflammatory Markers  No results for input(s): \"CRP\", \"AMBROSIO\", \"LDH\", \"DDIMER\" in the last 168 hours.    No results for input(s): \"TROP\", \"TROPHS\", \"CK\" in the last 168 hours.    Imaging: Imaging data reviewed in Epic.    Medications:    sennosides  17.2 mg Oral Nightly    docusate sodium  100 mg Oral BID    dexamethasone  4 mg Intravenous Q6H    insulin aspart  1-10 Units Subcutaneous TID AC and HS    levothyroxine  50 mcg Oral Daily    cetirizine  10 mg Oral Daily    melatonin  5 mg Oral Nightly    sertraline  50 mg Oral Daily    [Held by provider] spironolactone  100 mg Oral Daily       Assessment & Plan:    Cervical radiculopathy sp discectomy and arthoplasty 11/12/2024  Pain control  PT/OT  Management per NS  Dizziness  IVF  Diabetes mellitus  Correctional scale  Hypothyroidism  Synthroid   Acne  Hold Spirolactone  Asthma  Anxiety    Supplementary Documentation:   Quality:  DVT Prophylaxis: SCDs    Home when cleared by NS.    Plan of care discussed with patient and RN.     Rebel Nunez MD            Supplementary Documentation:   DVT Mechanical Prophylaxis: TELMA hose, SCDs,    DVT Pharmacologic Prophylaxis   Medication   None                Code Status: Not on file  Tobar: No urinary catheter in place  Tobar Duration (in days): 2  Central line:    VICKI: 11/13/2024

## 2024-11-13 NOTE — PLAN OF CARE
A&O x 4. VSS. On RA. Dilaudid PCA/Robaxin for pain as needed. Denies N/T to BUE's. Anterior neck dressing C/D/I. DTV post Tobar removal. SCD's/TELMA's. PT/OT to see. Plan likely home later today. Reviewed POC with pt. Will continue to monitor.

## 2024-11-13 NOTE — ANESTHESIA POSTPROCEDURE EVALUATION
[facility]    Madison Villegas Patient Status:  Outpatient in a Bed   Age/Gender 39 year old female MRN [id number]   Location [facility] POST ANESTHESIA CARE UNIT Attending ITA Cid DO   Hosp Day # 0 PCP Warren Powell MD       Anesthesia Post-op Note    right anterior cervical 5- cervical 6, cervical 6-cervical 7 discectomy and arthroplasty    Procedure Summary       Date: 11/12/24 Room / Location:  MAIN OR [address] [place] [address] MAIN OR    Anesthesia Start: 1528 Anesthesia Stop: 1811    Procedures:       right anterior cervical 5- cervical 6, cervical 6-cervical 7 discectomy and arthroplasty (Right: Spine Cervical)      INTRAOPERATIVE NEURO MONITORING (Right: Spine Cervical) Diagnosis:       Cervical radiculopathy      (Cervical radiculopathy [M54.12])    Surgeons: ITA Cid DO Anesthesiologist: Ciro Reeves MD    Anesthesia Type: general ASA Status: 2            Anesthesia Type: general    Vitals Value Taken Time   /78 11/12/24 1809   Temp 97.4 11/12/24 1812   Pulse 86 11/12/24 1812   Resp 19 11/12/24 1812   SpO2 100 % 11/12/24 1812   Vitals shown include unfiled device data.    Patient Location: PACU    Anesthesia Type: general    Airway Patency: patent and extubated    Postop Pain Control: adequate    Mental Status: mildly sedated but able to meaningfully participate in the post-anesthesia evaluation    Nausea/Vomiting: none    Cardiopulmonary/Hydration status: stable euvolemic    Complications: no apparent anesthesia related complications    Postop vital signs: stable    Comments:   The airway was removed in the procedure area.  Cable monitors were removed, and the patient was transported with observation to the recovery area personally with the OR team.  The patient was responsive in a meaningful way and demonstrated a good airway.  PACU monitors were then applied with device connection to Epic.  Full report signout, including report, identifications, history, procedure,  anesthesia course, recovery expectations with chance for questions was provided to a responsible recovery RN.    Dental Exam: Unchanged from Preop    Patient to be discharged from PACU when criteria met.

## 2024-11-13 NOTE — CONSULTS
[facility]  Report of Consultation    Madison Villegas Patient Status:  Outpatient in a Bed    1985 MRN [id number]   Location [facility] 3SW-A Attending ITA Cid,    Hosp Day # 0 PCP Warren Powell MD     Reason for Consultation:  Medical management    REFERRING PHYSICIAN: ITA Triana,       Chief Complaint: Status post anterior cervical discectomy by neurosurgery Dr. Cid    History of Present Illness:  Madison Villegas is a  39 year old female admitted Status post anterior cervical discectomy by neurosurgery Dr. Cid.  We are consulted for medical management of chronic medical problems including diabetes mellitus type 2, anxiety, depression, hypothyroidism currently controlled on medications.  Patient complains of expected incisional pain 2 out of 10 pain.  Denies any nausea vomiting.  Denies any focal weakness or numbness.  No fever or chills.      History:  Past Medical History:    Anxiety    Depression    Diabetes (HCC)    Hx of motion sickness    Muscle weakness    Visual impairment    contacts     Past Surgical History:   Procedure Laterality Date    Other surgical history Left 2007    knee arthroscopy meniscus     Family History   Problem Relation Age of Onset    Other (Other) Mother         anemia     Social History:   reports that she has never smoked. She has never used smokeless tobacco. She reports that she does not drink alcohol and does not use drugs.    Allergies:  Allergies[1]    Medications:    Current Facility-Administered Medications:     lactated ringers infusion, , Intravenous, Continuous    sodium chloride 0.9% infusion, , Intravenous, Continuous    HYDROmorphone in sodium chloride 0.9% (Dilaudid) 20 mg/100mL PCA premix, , Intravenous, Continuous    HYDROmorphone 0.2 mg BOLUS FROM PCA, 0.2 mg, Intravenous, Q10 Min PRN    naloxone (Narcan) 0.4 MG/ML injection 0.08 mg, 0.08 mg, Intravenous, Q5 Min PRN    diphenhydrAMINE (Benadryl) 50 mg/mL   injection 12.5 mg, 12.5 mg, Intravenous, Q4H PRN    ondansetron (Zofran) 4 MG/2ML injection 4 mg, 4 mg, Intravenous, Q6H PRN    sodium chloride 0.9 % IV bolus 500 mL, 500 mL, Intravenous, Once PRN    sennosides (Senokot) tab 17.2 mg, 17.2 mg, Oral, Nightly    docusate sodium (Colace) cap 100 mg, 100 mg, Oral, BID    polyethylene glycol (PEG 3350) (Miralax) 17 g oral packet 17 g, 17 g, Oral, Daily PRN    magnesium hydroxide (Milk of Magnesia) 400 MG/5ML oral suspension 30 mL, 30 mL, Oral, Daily PRN    bisacodyl (Dulcolax) 10 MG rectal suppository 10 mg, 10 mg, Rectal, Daily PRN    fleet enema (Fleet) rectal enema 133 mL, 1 enema, Rectal, Once PRN    prochlorperazine (Compazine) 10 MG/2ML injection 5 mg, 5 mg, Intravenous, Q8H PRN    diphenhydrAMINE (Benadryl) cap/tab 25 mg, 25 mg, Oral, Q4H PRN **OR** diphenhydrAMINE (Benadryl) 50 mg/mL  injection 25 mg, 25 mg, Intravenous, Q4H PRN    benzocaine-menthol (Cepacol) lozenge 1 lozenge, 1 lozenge, Buccal, Q15 Min PRN    sodium chloride 0.9% infusion, , Intravenous, Continuous    methocarbamol (Robaxin) tab 750 mg, 750 mg, Oral, Q6H PRN    diazePAM (Valium) tab 5 mg, 5 mg, Oral, Q6H PRN    dexamethasone (Decadron) 4 MG/ML injection 4 mg, 4 mg, Intravenous, Q6H    [START ON 11/13/2024] ceFAZolin (Ancef) 2g in 10mL IV syringe premix, 2 g, Intravenous, Q8H    ketorolac (Toradol) 30 MG/ML injection 30 mg, 30 mg, Intravenous, Q6H PRN    glucose (Dex4) 15 GM/59ML oral liquid 15 g, 15 g, Oral, Q15 Min PRN **OR** glucose (Glutose) 40% oral gel 15 g, 15 g, Oral, Q15 Min PRN **OR** glucose-vitamin C (Dex-4) chewable tab 4 tablet, 4 tablet, Oral, Q15 Min PRN **OR** dextrose 50% injection 50 mL, 50 mL, Intravenous, Q15 Min PRN **OR** glucose (Dex4) 15 GM/59ML oral liquid 30 g, 30 g, Oral, Q15 Min PRN **OR** glucose (Glutose) 40% oral gel 30 g, 30 g, Oral, Q15 Min PRN **OR** glucose-vitamin C (Dex-4) chewable tab 8 tablet, 8 tablet, Oral, Q15 Min PRN    insulin aspart (NovoLOG) 100  Units/mL FlexPen 1-10 Units, 1-10 Units, Subcutaneous, TID AC and HS    [START ON 11/13/2024] levothyroxine (Synthroid) tab 50 mcg, 50 mcg, Oral, Daily    [START ON 11/13/2024] cetirizine (ZyrTEC) tab 10 mg, 10 mg, Oral, Daily    melatonin cap/tab 5 mg, 5 mg, Oral, Nightly    [START ON 11/13/2024] sertraline (Zoloft) tab 50 mg, 50 mg, Oral, Daily    spironolactone (Aldactone) tab 100 mg, 100 mg, Oral, Daily    Review of Systems:  A comprehensive 14 point review of systems was completed.  Pertinent positives and negatives noted in the the HPI.    Physical Exam:  Vital signs: Blood pressure 141/82, pulse 90, temperature 99.2 °F (37.3 °C), temperature source Temporal, resp. rate 15, height 5' 6\" (1.676 m), weight 239 lb 6.7 oz (108.6 kg), SpO2 97%.  General: No acute distress.   HEENT: Moist mucous membranes.   Neck: Anterior neck incision in dressing  Respiratory: Clear to auscultation bilaterally.  No wheezes. No rhonchi.  Cardiovascular: S1, S2.  Regular rate and rhythm.    Abdomen: Soft, nontender, nondistended.  Positive bowel sounds.   Neurologic: Awake alert, no focal neurological deficits.  Musculoskeletal: Full range of motion of all extremities.  No pedal edema or calf tenderness  Psychiatric: Appropriate mood and affect.    Laboratory Data:  Lab Results   Component Value Date    PGLU 154 11/12/2024         ASSESSMENT / PLAN:   Status post anterior cervical discectomy by neurosurgery Dr. Cid-managed by surgeon    Diabetes mellitus type 2  Hyperglycemia protocol  Follow Accu-Cheks  Hold home metformin while in hospital  NovoLog correction factor coverage as needed  Anxiety, depression  Continue home medication sertraline  Hypertension  Continue home spironolactone  Hypothyroidism  Continue home levothyroxine      Quality:  DVT Prophylaxis: SCD  CODE status: Full  Tobar: Yes    Plan of care discussed with patient        Thank you for allowing me to participate in the care of your patient.   My colleague will  follow from morning tomorrow    Radha Louise MD  11/12/2024  9:19 PM         [1]   Allergies  Allergen Reactions    Alcohol HIVES     Liquor only    Doxycycline HIVES

## 2024-11-13 NOTE — OPERATIVE REPORT
Date of surgery 11/12/24     Preoperative dx  cervical myelo radiculopathy       Postoperative dx  cervical myelo radiculopathy     Procedure [please list them in numbered format]   1. Anterior right-sided approach to the cervical spine from C5-7   2. Anterior disc resections at C5-6, C6-7   4. Microfoaminotomies at C5-6 bilaterally   5. Use of microscope for decompression procedure   6. Anterior interbody placement of artifical disc [Mobi C] at C5-6 and C6-7   11. Intraoperative SSEP monitoring   12. Radiographs for identification of surgical level and appropriate placement of hardware     Surgeon[s] Jose Roberto Omalley SA     Anesthesia  GETA     IVF 1500 EBL 20  Uout  NR  Specimen  none     Drains  none     Complications none  Condition  stable to PACU     Indications     This is a 40 YO F with intractable neck pain and RUE radiculopathy, having failed [all nonoperative management]. It was mutually decided that surgical intervention was the best option at this point. All risks and benefits of the surgery were explained to the patient, and he/she wished to proceed with the surgery.     Details of surgery     Patient was placed supine on the OR table, and general anesthesia was induced. The head halter traction device was set up and 10 lbs of weight. The neck was extended within the comfortable range as checked preoperatively. 3-inch wide cloth tape was used to tape down the shoulders. All bony prominences were well-padded. [A radioopaque skin marker was placed at what appeared to be the cervical disc level of interest, and a lateral radiograph taken.]   A right-sided, approximately 2-inch long transverse incision was made at approximately the C5 level as denoted by anatomic landmarks [the marker]. A 15-blade scalpel was used to incise the skin and subcutaneous tissues, down through the platysma muscle until the superficial layer of the deep cervical fascia was exposed. This layer was then carefully  incised vertically along the medial border of the sternocleiodomastoid muscle using metzenbaum scissors. Then finger dissection was used to bluntly separate the deeper lateral carotid sheath from the medial tracheo-esophageal bundle. Hand-held cloward retractors were used to expose the longus colli muscles anterior to the spine, and Kitners were used to bluntly dissect them away bilaterally, exposing the periosteum and disc spaces. A 18 gauge needle was used to fanny the disc space and a lateral radiograph taken to confirm the level.   Once the level was confirmed radiographically, then subperiosteal dissection was performed  over the disc space[s] and vertebral bodies of interest, taking great precaution not to violate the uninvolved disc spaces. Kitners and bipolar cautery were used. Self-retaining retractors were then placed. [Distraction pins were carefully inserted into the spanning vertebral bodies.] The microscope was then carefully positioned.   Anterior cervical diskectomy [ies] was then commenced. Discectomies were performed identically at each level.  Annulotomy was performed using an 11-blade. Then small pituitary rongeurs and curettes were used to remove the disk material.   [The cartilaginous endplates were completely removed in preparation of implant/ prosthesis placement]. Posterior vertebral osteophytes were removed with small kerrison rongeurs. Bilateral microforaminotomies were also performed using kerrisons.   The implant size was then estimated using trial implants. This was done with 30 lbs of neck traction by anesthesiology [with distraction using the pin distractors]. Then the appropriate sized implant was chosen.  It was then carefully malleted into place. The neck distraction across the disk space was then released.   After all implants [grafts] were in place, abundant irrigation of the wound was performed with antibiotic-containing irrigation. All bleeding was controlled with bipolar  cautery, thrombin-soaked gelfoam, and floseal. Closure was then commenced. The platysma muscle was closed using interrupted [2-0] vicryl. The subcutaneous layer was closed using [3-0] interrupted vicryl. The skin was then closed using mastisol and steri-strips. Xeroform and 4x4 gauze was applied and secured with microporous tape. All needle and sponge counts were correct. There were no complications during this surgery.  The patient was then revived, extubated, and taken to recovery room in stable condition.

## 2024-11-15 NOTE — TELEPHONE ENCOUNTER
Received disability form [employer] in forms department as a jpeg. Converted pictures into a pdf and sent a new email to forms department. Labeled email with pdf 2-2. Logged for processing. Valid authorization on file.

## 2024-11-18 ENCOUNTER — OFFICE VISIT (OUTPATIENT)
Dept: SURGERY | Facility: CLINIC | Age: 39
End: 2024-11-18
Payer: COMMERCIAL

## 2024-11-18 VITALS
BODY MASS INDEX: 38.41 KG/M2 | HEART RATE: 80 BPM | SYSTOLIC BLOOD PRESSURE: 120 MMHG | DIASTOLIC BLOOD PRESSURE: 70 MMHG | HEIGHT: 66 IN | WEIGHT: 239 LBS

## 2024-11-18 DIAGNOSIS — Z98.890 S/P CERVICAL DISC REPLACEMENT: Primary | ICD-10-CM

## 2024-11-18 PROCEDURE — 99024 POSTOP FOLLOW-UP VISIT: CPT

## 2024-11-18 NOTE — PROGRESS NOTES
She is accompanied by her .  [facility]  Neurosurgery Clinic Visit: Post-Op Follow Up  2024     Madison Villegas PCP: Warren Powell MD    1985 MRN [id number]     REASON FOR VISIT: 1 week post-op follow up    SUBJECTIVE     Madison Villegas is a pleasant 39 year old female here for follow up s/p right C5-C7 ADR with Dr. Cid on 24.  She is accompanied by her .   Since surgery, patient states she has been doing well.  Reports expected postop muscle soreness to the posterior neck and upper shoulders.  She gets occasional twinges down the right upper extremity from shoulder to fingertips.  Reports sensation of a \"lump\" in her throat, otherwise denies dysphagia.  She has nearly completed prescribed MDP.  She is taking Norco and methocarbamol to relief.  Reports improved ROM to neck and upper extremities. Denies any new pain, paresthesias or weakness. Denies incisional issues.    MEDICAL HISTORY     Past Medical History:    Anxiety    Depression    Diabetes (HCC)    Hx of motion sickness    Muscle weakness    Visual impairment    contacts      Past Surgical History:   Procedure Laterality Date    Other surgical history Left 2007    knee arthroscopy meniscus    Other surgical history  2024    right anterior cervical 5- cervical 6, cervical 6-cervical 7 discectomy and arthroplasty Right      Family History   Problem Relation Age of Onset    Other (Other) Mother         anemia      Social History     Socioeconomic History    Marital status:    Tobacco Use    Smoking status: Never    Smokeless tobacco: Never   Vaping Use    Vaping status: Never Used   Substance and Sexual Activity    Alcohol use: No    Drug use: Never      Allergies[1]     MEDICATIONS      methylPREDNISolone 4 MG Oral Tablet Therapy Pack Take as directed on dose pack instructions 21 tablet 0    HYDROcodone-acetaminophen 5-325 MG Oral Tab Take 1 tablet by mouth every 6  (six) hours as needed for Pain. 30 tablet 0    methocarbamol 750 MG Oral Tab Take 1 tablet (750 mg total) by mouth every 6 (six) hours as needed (muscle spasms). 45 tablet 0    melatonin 5 MG Oral Cap Take 1 capsule (5 mg total) by mouth nightly.      SERTRALINE 50 MG Oral Tab TAKE 1 TABLET DAILY 90 tablet 1    metFORMIN  MG Oral Tablet 24 Hr Take 1 tablet (500 mg total) by mouth daily with breakfast.      Continuous Blood Gluc Sensor (DEXCOM G6 SENSOR) Does not apply Misc 1 Device Every 10 days. CHANGE SENSOR EVERY 10 DAYS      Continuous Blood Gluc Transmit (DEXCOM G6 TRANSMITTER) Does not apply Misc 1 Device every 3 (three) months.      Glucose Blood (ONETOUCH VERIO) In Vitro Strip USE 2 STRIPS TWICE DAILY      loratadine (CLARITIN) 10 MG Oral Tab Take 1 tablet (10 mg total) by mouth nightly as needed.      NEXPLANON 68 MG Subcutaneous Implant       levothyroxine (SYNTHROID) 50 MCG Oral Tab Take 1 tablet (50 mcg total) by mouth daily. 30 tablet 0     REVIEW OF SYSTEMS     Denies fever, chills, shortness of breath, chest pain, or calf pain.     PHYSICAL EXAMINATION     VITALS: /70 (BP Location: Right arm, Patient Position: Sitting, Cuff Size: adult)   Pulse 80   Ht 66\"   Wt 239 lb (108.4 kg)   BMI 38.58 kg/m²     GENERAL: No acute distress, non-toxic appearing    HEENT:  Normocephalic, atraumatic    SKIN: Warm, dry. Surgical incision is approximated with steri strips. Incision is clean, dry and intact without signs of drainage, edema, or erythema.     NEUROLOGICAL: Alert and oriented x 3. Sensation to light touch is intact bilaterally.  Gait intact, non-antalgic.     UPPER EXTREMITY STRENGTH:    Deltoid  Biceps  Triceps   W.flexion  W.extension    Finger abduction   Right 5 5 5 5  5 5 5   Left 5 5 5 5 5 5 5     IMAGING     No new imaging to review    ASSESSMENT AND PLAN     ASSESSMENT:   1. S/P cervical disc replacement       PLAN:   Continue to wean narcotics as able  No driving or operating  machinery while taking narcotics  No lifting greater than 5 lbs until next appointment  Reviewed activity restrictions and incisional care  Schedule PT - order is active  F/U in 3 weeks    Tatiana Kurtz, MSN, APRN, FNP-[facility]  Neurosurgery   [address] [place] [address], [address]  [place] [address]  [phone]             [1]   Allergies  Allergen Reactions    Alcohol HIVES     Liquor only    Doxycycline HIVES

## 2024-11-18 NOTE — PROGRESS NOTES
Established patient:  Reason for follow up: 1 week post op    right anterior cervical 5- cervical 6, cervical 6-cervical 7 discectomy and arthroplasty Right       Numeric Rating Scale:   Pain at Present:  2/10       Distribution of Pain:    right    Most recent treatments for Current Pain Condition:   Surgery      Response to treatment: some relief

## 2024-11-19 NOTE — TELEPHONE ENCOUNTER
Dr. Cid,    Please sign off on form if you agree to:   Surgery Confirmation; 11/2/24  -Signature page will be the first page scanned  -From your Inbasket, Highlight the patient and click Chart   -Double click the 11/11/24 Forms Completion telephone encounter  -Scroll down to the Media section   -Click the blue Hyperlink: Surgery confirmation Dr Cid 11/19/24  -Click Acknowledge located in the top right ribbon/menu   -Drag the mouse into the blank space of the document and a + sign will appear. Left click to   electronically sign the document.  -Once signed, simply exit out of the screen and you signature will be saved.     Thank you,      Fouzia

## 2024-11-26 ENCOUNTER — PATIENT MESSAGE (OUTPATIENT)
Dept: SURGERY | Facility: CLINIC | Age: 39
End: 2024-11-26

## 2024-11-27 NOTE — TELEPHONE ENCOUNTER
Message below noted.    Patient sent picture of incision. Patient stated she feels a stitch poking out and noticed a little bleeding yesterday 11/26/24. Patient described blood amount as the size of a pencil tip x2. Patient denies any signs of infections and pain.     LOV 11/18/24  \"ASSESSMENT:   1. S/P cervical disc replacement       PLAN:   Continue to wean narcotics as able  No driving or operating machinery while taking narcotics  No lifting greater than 5 lbs until next appointment  Reviewed activity restrictions and incisional care  Schedule PT - order is active  F/U in 3 weeks\"    Routed to Provider.

## 2024-11-27 NOTE — TELEPHONE ENCOUNTER
Sent patient a response via [employer]. Patient advised to cut exposed suture to the skin; it is a dissolvable suture so it will reabsorb.  Her incision looks great otherwise.

## 2024-12-09 ENCOUNTER — OFFICE VISIT (OUTPATIENT)
Dept: SURGERY | Facility: CLINIC | Age: 39
End: 2024-12-09
Payer: COMMERCIAL

## 2024-12-09 VITALS — DIASTOLIC BLOOD PRESSURE: 74 MMHG | OXYGEN SATURATION: 96 % | HEART RATE: 83 BPM | SYSTOLIC BLOOD PRESSURE: 118 MMHG

## 2024-12-09 DIAGNOSIS — Z98.890 S/P CERVICAL DISC REPLACEMENT: Primary | ICD-10-CM

## 2024-12-09 NOTE — PROGRESS NOTES
Patient is here today for Post Op visit - 11/12/2024- Sx: Right anterior cervical 5- cervical 6, cervical 6-cervical 7 discectomy and arthroplasty    IMAGING:  - 11/13/2024 - XR Cervical Spine    TREATMENTS:  - [place] and methocarbamol- taking infrequently prn  - Physical Therapy starts this week     Pain Scale:  pt states some numbness in fingers on left hand, pt states ongoing intermittent sensation of \"lump\" in throat

## 2024-12-09 NOTE — PROGRESS NOTES
S: Patient seen and examined. Doing well. Denies any new symptoms.    O: AVSS   Neuro: stable    A/P s/p ADR  -reviewed XR-hardware intact  -start PT

## 2024-12-19 DIAGNOSIS — M54.12 CERVICAL RADICULOPATHY: ICD-10-CM

## 2024-12-20 RX ORDER — HYDROCODONE BITARTRATE AND ACETAMINOPHEN 5; 325 MG/1; MG/1
1 TABLET ORAL EVERY 6 HOURS PRN
Qty: 25 TABLET | Refills: 0 | Status: SHIPPED | OUTPATIENT
Start: 2024-12-20

## 2024-12-20 RX ORDER — METHOCARBAMOL 750 MG/1
750 TABLET, FILM COATED ORAL EVERY 6 HOURS PRN
Qty: 30 TABLET | Refills: 0 | Status: SHIPPED | OUTPATIENT
Start: 2024-12-20

## 2024-12-20 NOTE — TELEPHONE ENCOUNTER
Medication:   Medication Quantity Refills Start End   HYDROcodone-acetaminophen 5-325 MG Oral Tab 30 tablet 0 11/13/2024 --   Sig:   Take 1 tablet by mouth every 6 (six) hours as needed for Pain.          Date of last refill: 11.13.24 (#30/0)  [place]      Last office visit: 12.9.24  Due back to clinic per last office note:    Date next office visit scheduled:  2.12.25  Future Appointments   Date Time Provider Department Center   2/12/2025 10:00 AM ITA Cid DO [facility]           Last OV note recommendation:       \" A/P s/p ADR  -reviewed XR-hardware intact  -start PT \"           Medication:   methocarbamol 750 MG Oral Tab 45 tablet 0 11/13/2024 --   Sig:   Take 1 tablet (750 mg total) by mouth every 6 (six) hours as needed (muscle spasms).          Date of last refill: 11.13.24 (#45/0)  [place]      Last office visit: 12.9.24  Due back to clinic per last office note:    Date next office visit scheduled:  2.12.25  Future Appointments   Date Time Provider Department Center   2/12/2025 10:00 AM ITA Cid DO [facility]           Last OV note recommendation:       \" A/P s/p ADR  -reviewed XR-hardware intact  -start PT \"

## 2025-01-02 ENCOUNTER — PATIENT MESSAGE (OUTPATIENT)
Dept: SURGERY | Facility: CLINIC | Age: 40
End: 2025-01-02

## 2025-01-02 NOTE — TELEPHONE ENCOUNTER
Message below noted.    Patient is feeling ready to go back to work. Therapy is going well with no pain, no numbness, just tightness, but it's getting better.     Patient request return to work letter for any date after 1/09/25 with no restrictions.     Advised patient to provider additional information for work letter.

## 2025-01-03 ENCOUNTER — TELEPHONE (OUTPATIENT)
Dept: SURGERY | Facility: CLINIC | Age: 40
End: 2025-01-03

## 2025-01-03 NOTE — TELEPHONE ENCOUNTER
Message received from patient.    Advised patient of restrictions and to discuss with her Managers regarding working with her until 2/12/25 appointment.

## 2025-01-03 NOTE — TELEPHONE ENCOUNTER
Received response from patient via [employer] asking for return to work letter.  Patient underwent surgery with Dr. Cid:    11/12/2024- Sx: Right anterior cervical 5- cervical 6, cervical 6-cervical 7 discectomy and arthroplasty     Per Dr. Cid and STEVAN Babcock at office visit on 12.9.24:    \"S: Patient seen and examined. Doing well. Denies any new symptoms.     O: AVSS   Neuro: stable     A/P s/p ADR  -reviewed XR-hardware intact  -start PT           Progress Notes  Sadiq Christian CMA (Certified Medical Assistant)  Patient is here today for Post Op visit - 11/12/2024- Sx: Right anterior cervical 5- cervical 6, cervical 6-cervical 7 discectomy and arthroplasty     IMAGING:  - 11/13/2024 - XR Cervical Spine     TREATMENTS:  - [place] and methocarbamol- taking infrequently prn  - Physical Therapy starts this week      Pain Scale:  pt states some numbness in fingers on left hand, pt states ongoing intermittent sensation of \"lump\" in throat\"             Letter initiated and pended and routed to GUERO Sung.

## 2025-01-03 NOTE — TELEPHONE ENCOUNTER
Patient contacted the office looking for feedback regarding when she will be off all restrictions. Please contact and advise.

## 2025-01-03 NOTE — TELEPHONE ENCOUNTER
Message below noted.    Advised patient of letter and to let us know if there is anything else we can help her with.

## 2025-01-03 NOTE — TELEPHONE ENCOUNTER
Message below noted.    Please see TE from 1/02/25.     Patient was advised of message.    Nothing needed further with this encounter.

## 2025-01-06 NOTE — TELEPHONE ENCOUNTER
Patient calling for work restrictions. She would much rather have a phone call than come in for an office visit to discuss

## 2025-01-06 NOTE — TELEPHONE ENCOUNTER
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     [facility] Future Appointments    Encounter Information   Provider Department Center   2/12/2025 10:00 AM ITA Cid DO       Routed to GUERO Sung.

## 2025-01-15 ENCOUNTER — TELEPHONE (OUTPATIENT)
Dept: SURGERY | Facility: CLINIC | Age: 40
End: 2025-01-15

## 2025-01-15 NOTE — TELEPHONE ENCOUNTER
Patient called to see if we received forms that were sent to our office twice, advised patient no forms received on the dates she gave. Patient emailed forms while on the phone.     Disability forms received in forms dept valid authorization on file. Logged for processing.     Upon getting details from patient, patient wanted forms to say she went back to work on 1/13/25, but letter in chart DOES NOT reflect that date, patient advised letter printed 17/25 has her going back to work immediately. Patient said ok she will just submit the letter and not worry about the forms, patient said to just archive them if she need them she will call back        Type of Leave: disability   Reason for Leave: surgery (updated form from last forms)  Start date of leave:   End date of leave:  How many flare ups per month/length?:  How many appts per month/length?:   Was Fee and Turnaround info Given?:

## 2025-01-19 DIAGNOSIS — F41.9 ANXIETY: ICD-10-CM

## 2025-01-20 NOTE — TELEPHONE ENCOUNTER
Last refill: 05/21/24  Qty: 90  W/ 1 refills  Last ov: 10/29/24    Requested Prescriptions     Pending Prescriptions Disp Refills    SERTRALINE 50 MG Oral Tab [Pharmacy Med Name: SERTRALINE TAB 50MG] 90 tablet 1     Sig: TAKE 1 TABLET DAILY     Future Appointments   Date Time Provider Department Center   2/17/2025 10:00 AM ITA Cid DO ENINAPER2 [facility]

## 2025-01-28 ENCOUNTER — TELEPHONE (OUTPATIENT)
Dept: FAMILY MEDICINE CLINIC | Facility: CLINIC | Age: 40
End: 2025-01-28

## 2025-02-17 ENCOUNTER — OFFICE VISIT (OUTPATIENT)
Dept: SURGERY | Facility: CLINIC | Age: 40
End: 2025-02-17
Payer: COMMERCIAL

## 2025-02-17 VITALS
HEIGHT: 66 IN | DIASTOLIC BLOOD PRESSURE: 80 MMHG | BODY MASS INDEX: 38.41 KG/M2 | WEIGHT: 239 LBS | HEART RATE: 90 BPM | SYSTOLIC BLOOD PRESSURE: 130 MMHG

## 2025-02-17 DIAGNOSIS — Z98.890 S/P CERVICAL DISC REPLACEMENT: Primary | ICD-10-CM

## 2025-02-17 RX ORDER — SPIRONOLACTONE 100 MG/1
TABLET, FILM COATED ORAL
COMMUNITY
Start: 2025-01-07

## 2025-02-17 NOTE — PROGRESS NOTES
S: Patient doing well. States she has some neck stiffnes but otherwise is doing well. Denies any new symptoms.    O: AVSS   Neuro: stable    A/P s/p ADR  -continue PT and HEP  -ok to start accupuncture and chiro as needed  -F/U in 3 months

## 2025-02-17 NOTE — PATIENT INSTRUCTIONS
Refill policies:    Allow 2-3 business days for refills; controlled substances may take longer.  Contact your pharmacy at least 5 days prior to running out of medication and have them send an electronic request or submit request through the “request refill” option in your [employer] account.  Refills are not addressed on weekends; covering physicians do not authorize routine medications on weekends.  No narcotics or controlled substances are refilled after noon on Fridays or by on call physicians.  By law, narcotics must be electronically prescribed.  A 30 day supply with no refills is the maximum allowed.  If your prescription is due for a refill, you may be due for a follow up appointment.  To best provide you care, patients receiving routine medications need to be seen at least once a year.  Patients receiving narcotic/controlled substance medications need to be seen at least once every 3 months.  In the event that your preferred pharmacy does not have the requested medication in stock (e.g. Backordered), it is your responsibility to find another pharmacy that has the requested medication available.  We will gladly send a new prescription to that pharmacy at your request.    Scheduling Tests:    If your physician has ordered radiology tests such as MRI or CT scans, please contact Central Scheduling at [phone] right away to schedule the test.  Once scheduled, the [facility] Centralized Referral Team will work with your insurance carrier to obtain pre-certification or prior authorization.  Depending on your insurance carrier, approval may take 3-10 days.  It is highly recommended patients assure they have received an authorization before having a test performed.  If test is done without insurance authorization, patient may be responsible for the entire amount billed.      Precertification and Prior Authorizations:  If your physician has recommended that you have a procedure or additional testing performed the [facility]  Centralized Referral Team will contact your insurance carrier to obtain pre-certification or prior authorization.    You are strongly encouraged to contact your insurance carrier to verify that your procedure/test has been approved and is a COVERED benefit.  Although the [facility] Centralized Referral Team does its due diligence, the insurance carrier gives the disclaimer that \"Although the procedure is authorized, this does not guarantee payment.\"    Ultimately the patient is responsible for payment.   Thank you for your understanding in this matter.  Paperwork Completion:  If you require FMLA or disability paperwork for your recovery, please make sure to either drop it off or have it faxed to our office at [phone]. Be sure the form has your name and date of birth on it.  The form will be faxed to our Forms Department and they will complete it for you.  There is a 25$ fee for all forms that need to be filled out.  Please be aware there is a 10-14 day turnaround time.  You will need to sign a release of information (SRINIVAS) form if your paperwork does not come with one.  You may call the Forms Department with any questions at [phone].  Their fax number is [phone].

## 2025-02-17 NOTE — PROGRESS NOTES
Established patient:  Reason for follow up: 3 month post op right anterior cervical 5- cervical 6, cervical 6-cervical 7 discectomy and arthroplasty     Numeric Rating Scale:   Pain at Present:  0/10       Distribution of Pain:    right    Most recent treatments for Current Pain Condition:   Physical Therapy and Surgery  Response to treatment: some relief

## 2025-05-06 LAB — AMB EXT HGBA1C: 9.4 %

## 2025-05-19 ENCOUNTER — OFFICE VISIT (OUTPATIENT)
Dept: SURGERY | Facility: CLINIC | Age: 40
End: 2025-05-19
Payer: COMMERCIAL

## 2025-05-19 VITALS — DIASTOLIC BLOOD PRESSURE: 66 MMHG | OXYGEN SATURATION: 97 % | SYSTOLIC BLOOD PRESSURE: 122 MMHG | HEART RATE: 81 BPM

## 2025-05-19 DIAGNOSIS — Z98.890 S/P CERVICAL DISC REPLACEMENT: Primary | ICD-10-CM

## 2025-05-19 PROCEDURE — 99212 OFFICE O/P EST SF 10 MIN: CPT

## 2025-05-19 NOTE — PATIENT INSTRUCTIONS
Refill policies:    Allow 2-3 business days for refills; controlled substances may take longer.  Contact your pharmacy at least 5 days prior to running out of medication and have them send an electronic request or submit request through the “request refill” option in your [employer] account.  Refills are not addressed on weekends; covering physicians do not authorize routine medications on weekends.  No narcotics or controlled substances are refilled after noon on Fridays or by on call physicians.  By law, narcotics must be electronically prescribed.  A 30 day supply with no refills is the maximum allowed.  If your prescription is due for a refill, you may be due for a follow up appointment.  To best provide you care, patients receiving routine medications need to be seen at least once a year.  Patients receiving narcotic/controlled substance medications need to be seen at least once every 3 months.  In the event that your preferred pharmacy does not have the requested medication in stock (e.g. Backordered), it is your responsibility to find another pharmacy that has the requested medication available.  We will gladly send a new prescription to that pharmacy at your request.    Scheduling Tests:    If your physician has ordered radiology tests such as MRI or CT scans, please contact Central Scheduling at [phone] right away to schedule the test.  Once scheduled, the [facility] the [facility] Centralized Referral Team will work with your insurance carrier to obtain pre-certification or prior authorization.  Depending on your insurance carrier, approval may take 3-10 days.  It is highly recommended patients assure they have received an authorization before having a test performed.  If test is done without insurance authorization, patient may be responsible for the entire amount billed.      Precertification and Prior Authorizations:  If your physician has recommended that you have a procedure or additional testing performed the Count includes the [facility]  Centralized Referral Team will contact your insurance carrier to obtain pre-certification or prior authorization.    You are strongly encouraged to contact your insurance carrier to verify that your procedure/test has been approved and is a COVERED benefit.  Although the [facility] Centralized Referral Team does its due diligence, the insurance carrier gives the disclaimer that \"Although the procedure is authorized, this does not guarantee payment.\"    Ultimately the patient is responsible for payment.   Thank you for your understanding in this matter.  Paperwork Completion:  If you require FMLA or disability paperwork for your recovery, please make sure to either drop it off or have it faxed to our office at [phone]. Be sure the form has your name and date of birth on it.  The form will be faxed to our Forms Department and they will complete it for you.  There is a 25$ fee for all forms that need to be filled out.  Please be aware there is a 10-14 day turnaround time.  You will need to sign a release of information (SRINIVAS) form if your paperwork does not come with one.  You may call the Forms Department with any questions at [phone].  Their fax number is [phone].

## 2025-05-19 NOTE — PROGRESS NOTES
Patient is here today s/pm11/12/2024- Sx: Right anterior cervical 5- cervical 6, cervical 6-cervical 7 discectomy and arthroplasty    - did PT @ [facility] and [facility]  - accupuncture did not need this service  - saw a chriopractor for a few sessions- good relief      -- no imaging since last OV

## 2025-05-19 NOTE — PROGRESS NOTES
[facility]  Neurosurgery Clinic Visit: Post-Op Follow Up  May 19, 2025     Madison Villegas PCP: Warren Powell MD    1985 MRN [id number]     REASON FOR VISIT: 6 month post-op follow up    SUBJECTIVE     Madison Villegas is a pleasant 39 year old female here for follow up s/p right C5-C7 ADR with Dr. Cid on 24. She is accompanied by her .  Patient states she is feeling very well.  Reports some occasional muscle muscle stiffness, otherwise states pre-op symptoms have resolved.  She and her family are traveling out of [place] to [place] [place] [facility] [place] at the end of the month and she is asking if she can ride roller coasters.    MEDICAL HISTORY     Past Medical History[1]   Past Surgical History[2]   Family History[3]   Social Hx on file[4]   Allergies[5]     MEDICATIONS     Current Medications[6]    REVIEW OF SYSTEMS     Denies fever, chills, shortness of breath, chest pain, or calf pain.     PHYSICAL EXAMINATION     VITALS: /66   Pulse 81   SpO2 97%     GENERAL: No acute distress, non-toxic appearing    MUSCULOSKELETAL: Moves all extremities freely.    SKIN: Warm, dry. Surgical incision healed    NEURO: Alert and oriented x3.      SPINE:  GAIT/COORDINATION:  Intact, non-antalgic.     SENSATION:  Intact to light touch to bilateral upper and lower extremities.    UPPER EXTREMITY STRENGTH:    Deltoid  Biceps  Triceps  Wrist   Flexion  Wrist Extension    Finger abduction     Right 5 5 5 5  5 5 5     Left 5 5 5 5 5 5 5     IMAGING     No new imaging to review    ASSESSMENT AND PLAN     ASSESSMENT:   1. S/P cervical disc replacement       PLAN:   Recovering well  Discussed with Dr. Cid - no roller [employer] x1 year after surgery  F/U in 6 months    Tatiana Kurtz, MSN, APRN, FNP-[facility]  Neurosurgery   [address], [address]  [place] [address]  [phone]          [1]   Past Medical History:   Anxiety    Depression    Diabetes (HCC)     Hx of motion sickness    Muscle weakness    Visual impairment    contacts   [2]   Past Surgical History:  Procedure Laterality Date    Other surgical history Left 09/01/2007    knee arthroscopy meniscus    Other surgical history  11/12/2024    right anterior cervical 5- cervical 6, cervical 6-cervical 7 discectomy and arthroplasty Right   [3]   Family History  Problem Relation Age of Onset    Other (Other) Mother         anemia   [4]   Social History  Socioeconomic History    Marital status:    Tobacco Use    Smoking status: Never    Smokeless tobacco: Never   Vaping Use    Vaping status: Never Used   Substance and Sexual Activity    Alcohol use: No    Drug use: Never   [5]   Allergies  Allergen Reactions    Alcohol HIVES     Liquor only    Doxycycline HIVES   [6]   No outpatient medications have been marked as taking for the 5/19/25 encounter (Office Visit) with Tatiana Kurtz APRN.

## 2025-07-24 DIAGNOSIS — F41.9 ANXIETY: ICD-10-CM

## 2025-07-24 NOTE — TELEPHONE ENCOUNTER
Last refill\" 01/20/25  Qty 90  W 1 refills  Last ov\" 10/29/24    Requested Prescriptions     Pending Prescriptions Disp Refills    SERTRALINE 50 MG Oral Tab [Pharmacy Med Name: SERTRALINE TAB 50MG] 90 tablet 1     Sig: TAKE 1 TABLET DAILY     Future Appointments   Date Time Provider Department Center   11/17/2025 11:30 AM Tatiana Kurtz APRN ENALEXANDERPER2 [facility]

## 2025-07-29 ENCOUNTER — PATIENT MESSAGE (OUTPATIENT)
Dept: SURGERY | Facility: CLINIC | Age: 40
End: 2025-07-29

## 2025-08-12 ENCOUNTER — TELEPHONE (OUTPATIENT)
Dept: FAMILY MEDICINE CLINIC | Facility: CLINIC | Age: 40
End: 2025-08-12

## 2025-08-18 ENCOUNTER — OFFICE VISIT (OUTPATIENT)
Dept: FAMILY MEDICINE CLINIC | Facility: CLINIC | Age: 40
End: 2025-08-18

## 2025-08-18 VITALS
BODY MASS INDEX: 36.96 KG/M2 | DIASTOLIC BLOOD PRESSURE: 74 MMHG | HEART RATE: 76 BPM | OXYGEN SATURATION: 98 % | RESPIRATION RATE: 12 BRPM | TEMPERATURE: 97 F | SYSTOLIC BLOOD PRESSURE: 106 MMHG | WEIGHT: 230 LBS | HEIGHT: 66 IN

## 2025-08-18 DIAGNOSIS — Z12.4 SCREENING FOR CERVICAL CANCER: ICD-10-CM

## 2025-08-18 DIAGNOSIS — Z00.00 WELL ADULT EXAM: ICD-10-CM

## 2025-08-18 DIAGNOSIS — E03.9 ACQUIRED HYPOTHYROIDISM: ICD-10-CM

## 2025-08-18 DIAGNOSIS — E11.9 DIABETES MELLITUS TYPE 2, NONINSULIN DEPENDENT (HCC): ICD-10-CM

## 2025-08-18 DIAGNOSIS — I10 PRIMARY HYPERTENSION: Primary | ICD-10-CM

## 2025-08-18 DIAGNOSIS — M54.12 CERVICAL RADICULOPATHY: ICD-10-CM

## 2025-08-18 DIAGNOSIS — F41.9 ANXIETY: ICD-10-CM

## 2025-08-18 PROBLEM — G89.29 CHRONIC PAIN OF LEFT KNEE: Status: RESOLVED | Noted: 2021-11-05 | Resolved: 2025-08-18

## 2025-08-18 PROBLEM — G89.29 CHRONIC BILATERAL LOW BACK PAIN WITHOUT SCIATICA: Status: RESOLVED | Noted: 2023-11-02 | Resolved: 2025-08-18

## 2025-08-18 PROBLEM — M54.50 CHRONIC BILATERAL LOW BACK PAIN WITHOUT SCIATICA: Status: RESOLVED | Noted: 2023-11-02 | Resolved: 2025-08-18

## 2025-08-18 PROBLEM — M54.2 NECK PAIN: Status: RESOLVED | Noted: 2023-11-02 | Resolved: 2025-08-18

## 2025-08-18 PROBLEM — M25.562 CHRONIC PAIN OF LEFT KNEE: Status: RESOLVED | Noted: 2021-11-05 | Resolved: 2025-08-18

## 2025-08-18 PROBLEM — M17.12 PRIMARY OSTEOARTHRITIS OF LEFT KNEE: Status: RESOLVED | Noted: 2021-11-07 | Resolved: 2025-08-18

## 2025-08-18 PROBLEM — E55.9 VITAMIN D DEFICIENCY: Status: RESOLVED | Noted: 2017-08-29 | Resolved: 2025-08-18

## 2025-08-18 PROBLEM — R22.32 SUBCUTANEOUS MASS OF LEFT THUMB: Status: RESOLVED | Noted: 2024-01-16 | Resolved: 2025-08-18

## 2025-08-18 RX ORDER — TRAZODONE HYDROCHLORIDE 100 MG/1
100 TABLET ORAL NIGHTLY
Qty: 90 TABLET | Refills: 0 | Status: SHIPPED | OUTPATIENT
Start: 2025-08-18 | End: 2025-11-16

## 2025-08-18 RX ORDER — SEMAGLUTIDE 0.68 MG/ML
0.5 INJECTION, SOLUTION SUBCUTANEOUS
COMMUNITY
Start: 2025-06-09

## 2025-08-18 RX ORDER — ACYCLOVIR 400 MG/1
1 TABLET ORAL
COMMUNITY

## (undated) DIAGNOSIS — R53.83 OTHER FATIGUE: ICD-10-CM

## (undated) DEVICE — COVER,TABLE,44X90,STERILE: Brand: MEDLINE

## (undated) DEVICE — KIT HEMSTAT MTRX 8ML PORCINE GEL HUM THROM

## (undated) DEVICE — LAMINECTOMY CDS: Brand: MEDLINE INDUSTRIES, INC.

## (undated) DEVICE — GLOVE SUR 8 SENSICARE PI PIP CRM PWD F

## (undated) DEVICE — Device: Brand: INTELLICART™

## (undated) DEVICE — SUT COAT VCRL 2-0 18IN CT-1 ABSRB UD CR 36MM

## (undated) DEVICE — ELECTRODE ES 2.75IN PTFE BLDE MOD E-Z CLN

## (undated) DEVICE — 3M™ TEGADERM™ TRANSPARENT FILM DRESSING FRAME STYLE, 1626W, 4 IN X 4-3/4 IN (10 CM X 12 CM), 50/CT 4CT/CASE: Brand: 3M™ TEGADERM™

## (undated) DEVICE — #15 STERILE STAINLESS BLADE: Brand: STERILE STAINLESS BLADES

## (undated) DEVICE — MARKER SKIN PREP-RESISTANT ST: Brand: MEDLINE INDUSTRIES, INC.

## (undated) DEVICE — DISPOSABLE SLIM BIPOLAR FORCEPS, NON-STICK,: Brand: SPETZLER-MALIS

## (undated) DEVICE — PAD,NON-ADHERENT,3X8,STERILE,LF,1/PK: Brand: MEDLINE

## (undated) DEVICE — SLEEVE COMPR MD KNEE LEN SGL USE KENDALL SCD

## (undated) DEVICE — COVER,LIGHT,CAMERA,HARD,1/PK,STRL: Brand: MEDLINE

## (undated) DEVICE — 3M™ STERI-STRIP™ REINFORCED ADHESIVE SKIN CLOSURES, R1547, 1/2 IN X 4 IN (12 MM X 100 MM), 6 STRIPS/ENVELOPE: Brand: 3M™ STERI-STRIP™

## (undated) DEVICE — GLOVE SUR 8 SENSICARE PI PIP GRN PWD F

## (undated) DEVICE — SPONGE 4X4 10PK

## (undated) DEVICE — DRAPE,TOWEL,LARGE,INVISISHIELD: Brand: MEDLINE

## (undated) DEVICE — PAD SACRAL SPAN AID

## (undated) DEVICE — E-Z CLEAN, NON-STICK, PTFE COATED, ELECTROSURGICAL BLADE ELECTRODE, MODIFIED EXTENDED INSULATION, 4 INCH (10.2 CM): Brand: MEGADYNE

## (undated) DEVICE — SUT MCRYL 3-0 27IN ABSRB UD 19MM PS-2 3/8

## (undated) DEVICE — SOLUTION RUBBING 4OZ 70% ISO ALC CLR

## (undated) DEVICE — C-ARM: Brand: UNBRANDED

## (undated) DEVICE — ELECTRODE ES L10.2CM BLDE L4IN EXT MPLR OPN

## (undated) DEVICE — MONITORING NEUROPHYSIOLOGICAL

## (undated) DEVICE — ZEISS MD DRAPE

## (undated) DEVICE — SOLUTION IRRIG 1000ML 0.9% NACL USP BTL

## (undated) DEVICE — 3.0MM PRECISION NEURO (MATCH HEAD)

## (undated) DEVICE — TRAY CATH 16FR F INCL BARDX IC COMPLT CARE

## (undated) NOTE — LETTER
[facility] [id number]  [phone]        Date: 8/17/2020      Patient Name: Desmond Leblanc            To Whom it may concern:     The above patient was seen at the [facility]

## (undated) NOTE — LETTER
1/3/2025        To Whom it may concern:    This letter has been written at the patient's request. The above patient underwent surgery with the [facility] for treatment of a medical condition.    This patient may return to work on 1/10/2025, with the following limitations:  - No lifting greater than 20 lbs  - Minimize excessive bending, lifting, twisting    If there are any additional questions, please do not hesitate to contact our office.           Sincerely,        GUERO Bal   [facility]  Neurosurgery   [address] [place] [address], [place] [address]  [phone]

## (undated) NOTE — LETTER
07/31/17        Illene Situ  [address] [address]           Dear Madison Gutierrez,    It has come to our attention that you are due for an office visit. Your last office visit was 1/27/17 .  If you could make an appointment with Dr. Armando Zuniga at y

## (undated) NOTE — MR AVS SNAPSHOT
[facility]  [address] [address] [phone]               Thank you for choosing us for your health care visit with Benedict Centeno MD.  We are glad to serve you and happy to provide you with this summary o aren’t getting fresh air, the brain tells the body to wake up just enough to tighten the muscles and unblock the air passage. With a loud gasp, breathing begins again.  This process may be repeated over and over again throughout the night, making your sleep anything that seems to affect your sleep. Then your healthcare provider can refer you to a sleep specialist and recommend a sleep study. Monitoring your sleep  Your sleep can be monitored at a sleep clinic or at your home.  In either case, your healthcare Inhale 2 puffs into the lungs every 6 (six) hours as needed for Wheezing. Commonly known as:  VENTOLIN HFA           * Citalopram Hydrobromide 10 MG Tabs   Take 1 tablet (10 mg total) by mouth daily.    Commonly known as:  CeleXA           * Citalopram Hy Fax:  [phone]    Diagnoses:  Fatigue, unspecified type   Snoring   Order:  Sleep Medicine - [place] [id number]         Follow-up Instructions     Return if symptoms worsen or fail to impro

## (undated) NOTE — LETTER
10/23/24          Madison Villegas  :  1985      To Whom It May Concern:    This patient was seen in our office on 10/22/24 and is scheduled to undergo surgery with the [facility] on 24.      Work status:  Remain off work until re-evaluation at scheduled post-operative appointment on 24.     If this office may be of further assistance, please do not hesitate to contact us.      Sincerely,        Tatiana Kurtz, MSN, APRN, FNP-[facility]  Neurosurgery   [address] [place] [address], [place] [address]  P: [phone]  F: [phone]

## (undated) NOTE — LETTER
[address] [place] [address] [address] [address]  [phone]            2021      Regarding:Madison Hope  : 1985     To whom it may concern, I am the primary care physician for

## (undated) NOTE — LETTER
10/21/24      RE: Madison Villegas     : 1985    Dear Dr. Warren Powell,    This letter is to inform you that your patient is being scheduled for surgery with Dr. Gerard Cid on 2024 at [facility]. We have asked the patient to contact your office to schedule a pre-operative exam/visit.     Diagnosis: Cervical Radiculopathy M54.12  Procedure: C5-6, C6-7 ADR     A Pre-operative History & Physical is needed for medical clearance. Please address patient's active problems (i.e high blood pressure, breathing issues etc.)  Pre-op labs are scheduled through the [facility] Pre-Admission department. Our pre-operative lab orders are located in our surgery order, if the patient would like these done through your office, you will need to place separate orders.     Please fax clearance letter/office visit note to our office at fax #: [phone]. Your office note must clearly indicate if the patient is medically cleared for surgery or not.    The following orders will be placed by pre-admission testing:  CBC  Comprehensive Metabolic Panel  PT/PTT/INR  MRSA/MSSA Nasal Swab  (*And any other pertinent testing based on patient's current clinical condition.)    If you have any questions, you may contact our office at [phone], option # 2.    Thank you,    MARTHA Staff

## (undated) NOTE — LETTER
OUTSIDE TESTING RESULT REQUEST     IMPORTANT: FOR YOUR IMMEDIATE ATTENTION  Please FAX all test results listed below to: [phone]     Testing already done on or about: 10/29/24     * * * * If testing is NOT complete, arrange with patient A.S.A.P. * * * *      Patient Name: aMdison Villegas  Surgery Date: 2024  Medical Record: [id number]  CSN: [id number]  : 1985 - A: 39 y     Sex: female  Surgeon(s):  ITA iCd DO  Procedure: anterior cervical 5- cervical 6, cervical 6-cervical 7 discectomy and arthroplasty  Anesthesia Type: General     Surgeon: ITA Cid DO     The following Testing and Time Line are REQUIRED PER ANESTHESIA     EKG READ AND SIGNED WITHIN   90 days  CBC [with Differential & Platelets] within  90 days  BMP (requires 4 hour fast) within  90 days  PT/INR within  30 days  PTT within  30 days  UA within  30 days  Type and Screen for Pre-Admission Testing (must be within 28 days of surgery)  MSSA/MRSA Nasal screening within 30 days      Thank You,   Sent by:CINDY Washington